# Patient Record
Sex: FEMALE | Race: WHITE | NOT HISPANIC OR LATINO | Employment: OTHER | URBAN - NONMETROPOLITAN AREA
[De-identification: names, ages, dates, MRNs, and addresses within clinical notes are randomized per-mention and may not be internally consistent; named-entity substitution may affect disease eponyms.]

---

## 2023-10-19 ENCOUNTER — TELEPHONE (OUTPATIENT)
Dept: PRIMARY CARE | Facility: CLINIC | Age: 68
End: 2023-10-19

## 2023-10-19 DIAGNOSIS — Z12.31 ENCOUNTER FOR SCREENING MAMMOGRAM FOR MALIGNANT NEOPLASM OF BREAST: ICD-10-CM

## 2023-11-20 ENCOUNTER — OFFICE VISIT (OUTPATIENT)
Dept: PRIMARY CARE | Facility: CLINIC | Age: 68
End: 2023-11-20
Payer: MEDICARE

## 2023-11-20 ENCOUNTER — LAB (OUTPATIENT)
Dept: LAB | Facility: LAB | Age: 68
End: 2023-11-20
Payer: MEDICARE

## 2023-11-20 VITALS
WEIGHT: 129.6 LBS | BODY MASS INDEX: 24.47 KG/M2 | HEART RATE: 54 BPM | SYSTOLIC BLOOD PRESSURE: 158 MMHG | OXYGEN SATURATION: 99 % | TEMPERATURE: 97.9 F | DIASTOLIC BLOOD PRESSURE: 70 MMHG | HEIGHT: 61 IN

## 2023-11-20 DIAGNOSIS — Z23 ENCOUNTER FOR IMMUNIZATION: ICD-10-CM

## 2023-11-20 DIAGNOSIS — Z12.31 ENCOUNTER FOR SCREENING MAMMOGRAM FOR MALIGNANT NEOPLASM OF BREAST: ICD-10-CM

## 2023-11-20 DIAGNOSIS — E55.9 VITAMIN D DEFICIENCY: ICD-10-CM

## 2023-11-20 DIAGNOSIS — E03.9 ACQUIRED HYPOTHYROIDISM: ICD-10-CM

## 2023-11-20 DIAGNOSIS — L29.9 ITCHING OF EAR: ICD-10-CM

## 2023-11-20 DIAGNOSIS — Z00.00 PHYSICAL EXAM: ICD-10-CM

## 2023-11-20 DIAGNOSIS — Z13.6 SCREENING FOR HEART DISEASE: ICD-10-CM

## 2023-11-20 DIAGNOSIS — R53.83 TIRED: ICD-10-CM

## 2023-11-20 DIAGNOSIS — Z00.00 PHYSICAL EXAM: Primary | ICD-10-CM

## 2023-11-20 LAB
ALBUMIN SERPL BCP-MCNC: 4.6 G/DL (ref 3.4–5)
ALP SERPL-CCNC: 121 U/L (ref 33–136)
ALT SERPL W P-5'-P-CCNC: 11 U/L (ref 7–45)
ANION GAP SERPL CALC-SCNC: 11 MMOL/L (ref 10–20)
AST SERPL W P-5'-P-CCNC: 17 U/L (ref 9–39)
BASOPHILS # BLD AUTO: 0.04 X10*3/UL (ref 0–0.1)
BASOPHILS NFR BLD AUTO: 0.8 %
BILIRUB SERPL-MCNC: 0.6 MG/DL (ref 0–1.2)
BUN SERPL-MCNC: 11 MG/DL (ref 6–23)
CALCIUM SERPL-MCNC: 10.2 MG/DL (ref 8.6–10.3)
CHLORIDE SERPL-SCNC: 103 MMOL/L (ref 98–107)
CHOLEST SERPL-MCNC: 250 MG/DL (ref 0–199)
CHOLESTEROL/HDL RATIO: 5.1
CO2 SERPL-SCNC: 29 MMOL/L (ref 21–32)
CREAT SERPL-MCNC: 0.83 MG/DL (ref 0.5–1.05)
EOSINOPHIL # BLD AUTO: 0.13 X10*3/UL (ref 0–0.7)
EOSINOPHIL NFR BLD AUTO: 2.4 %
ERYTHROCYTE [DISTWIDTH] IN BLOOD BY AUTOMATED COUNT: 14 % (ref 11.5–14.5)
GFR SERPL CREATININE-BSD FRML MDRD: 76 ML/MIN/1.73M*2
GLUCOSE SERPL-MCNC: 88 MG/DL (ref 74–99)
HCT VFR BLD AUTO: 45.9 % (ref 36–46)
HDLC SERPL-MCNC: 48.7 MG/DL
HGB BLD-MCNC: 14.4 G/DL (ref 12–16)
IMM GRANULOCYTES # BLD AUTO: 0.01 X10*3/UL (ref 0–0.7)
IMM GRANULOCYTES NFR BLD AUTO: 0.2 % (ref 0–0.9)
LDLC SERPL CALC-MCNC: 161 MG/DL
LYMPHOCYTES # BLD AUTO: 1.5 X10*3/UL (ref 1.2–4.8)
LYMPHOCYTES NFR BLD AUTO: 28.2 %
MCH RBC QN AUTO: 28.2 PG (ref 26–34)
MCHC RBC AUTO-ENTMCNC: 31.4 G/DL (ref 32–36)
MCV RBC AUTO: 90 FL (ref 80–100)
MONOCYTES # BLD AUTO: 0.59 X10*3/UL (ref 0.1–1)
MONOCYTES NFR BLD AUTO: 11.1 %
NEUTROPHILS # BLD AUTO: 3.05 X10*3/UL (ref 1.2–7.7)
NEUTROPHILS NFR BLD AUTO: 57.3 %
NON HDL CHOLESTEROL: 201 MG/DL (ref 0–149)
NRBC BLD-RTO: 0 /100 WBCS (ref 0–0)
PLATELET # BLD AUTO: 289 X10*3/UL (ref 150–450)
POTASSIUM SERPL-SCNC: 4.5 MMOL/L (ref 3.5–5.3)
PROT SERPL-MCNC: 7.8 G/DL (ref 6.4–8.2)
RBC # BLD AUTO: 5.1 X10*6/UL (ref 4–5.2)
SODIUM SERPL-SCNC: 138 MMOL/L (ref 136–145)
T4 FREE SERPL-MCNC: 0.84 NG/DL (ref 0.61–1.12)
TRIGL SERPL-MCNC: 200 MG/DL (ref 0–149)
TSH SERPL-ACNC: 8.42 MIU/L (ref 0.44–3.98)
VLDL: 40 MG/DL (ref 0–40)
WBC # BLD AUTO: 5.3 X10*3/UL (ref 4.4–11.3)

## 2023-11-20 PROCEDURE — 1159F MED LIST DOCD IN RCRD: CPT | Performed by: FAMILY MEDICINE

## 2023-11-20 PROCEDURE — 90662 IIV NO PRSV INCREASED AG IM: CPT | Performed by: FAMILY MEDICINE

## 2023-11-20 PROCEDURE — 1170F FXNL STATUS ASSESSED: CPT | Performed by: FAMILY MEDICINE

## 2023-11-20 PROCEDURE — 1126F AMNT PAIN NOTED NONE PRSNT: CPT | Performed by: FAMILY MEDICINE

## 2023-11-20 PROCEDURE — G0439 PPPS, SUBSEQ VISIT: HCPCS | Performed by: FAMILY MEDICINE

## 2023-11-20 PROCEDURE — 1160F RVW MEDS BY RX/DR IN RCRD: CPT | Performed by: FAMILY MEDICINE

## 2023-11-20 PROCEDURE — 82607 VITAMIN B-12: CPT

## 2023-11-20 PROCEDURE — 1036F TOBACCO NON-USER: CPT | Performed by: FAMILY MEDICINE

## 2023-11-20 PROCEDURE — 82306 VITAMIN D 25 HYDROXY: CPT

## 2023-11-20 PROCEDURE — 36415 COLL VENOUS BLD VENIPUNCTURE: CPT

## 2023-11-20 RX ORDER — LEVOTHYROXINE SODIUM 75 UG/1
75 TABLET ORAL DAILY
COMMUNITY
End: 2023-12-04 | Stop reason: ALTCHOICE

## 2023-11-20 RX ORDER — SUCRALFATE 1 G/10ML
1 SUSPENSION ORAL EVERY 12 HOURS
COMMUNITY
Start: 2022-09-26 | End: 2023-12-04 | Stop reason: WASHOUT

## 2023-11-20 RX ORDER — METOCLOPRAMIDE HYDROCHLORIDE 5 MG/1
TABLET, ORALLY DISINTEGRATING ORAL
COMMUNITY
End: 2023-12-04 | Stop reason: WASHOUT

## 2023-11-20 RX ORDER — FLUOCINOLONE ACETONIDE 0.11 MG/ML
5 OIL AURICULAR (OTIC) 2 TIMES DAILY PRN
Qty: 20 ML | Refills: 1 | Status: SHIPPED | OUTPATIENT
Start: 2023-11-20

## 2023-11-20 ASSESSMENT — ACTIVITIES OF DAILY LIVING (ADL)
BATHING: INDEPENDENT
GROCERY_SHOPPING: INDEPENDENT
TAKING_MEDICATION: INDEPENDENT
MANAGING_FINANCES: INDEPENDENT
DOING_HOUSEWORK: INDEPENDENT
DRESSING: INDEPENDENT

## 2023-11-20 ASSESSMENT — ENCOUNTER SYMPTOMS
NAUSEA: 0
DIZZINESS: 0
FEVER: 0
SHORTNESS OF BREATH: 0
DIARRHEA: 0
ENDOCRINE NEGATIVE: 1
DIFFICULTY URINATING: 0

## 2023-11-20 ASSESSMENT — PATIENT HEALTH QUESTIONNAIRE - PHQ9
SUM OF ALL RESPONSES TO PHQ9 QUESTIONS 1 AND 2: 0
2. FEELING DOWN, DEPRESSED OR HOPELESS: NOT AT ALL
1. LITTLE INTEREST OR PLEASURE IN DOING THINGS: NOT AT ALL

## 2023-11-20 ASSESSMENT — PAIN SCALES - GENERAL: PAINLEVEL: 0-NO PAIN

## 2023-11-20 NOTE — PROGRESS NOTES
"Subjective   Patient ID: Camilla Lal is a 68 y.o. female who presents for Medicare Annual Wellness Visit Subsequent.    Physical exam  Hypothyroidism  Tired  Itchy ears       Review of Systems   Constitutional:  Negative for fever.        Also see HPI   Eyes:  Negative for visual disturbance.   Respiratory:  Negative for shortness of breath.    Cardiovascular:  Negative for chest pain.   Gastrointestinal:  Negative for diarrhea and nausea.   Endocrine: Negative.    Genitourinary:  Negative for difficulty urinating.   Skin:  Negative for rash.   Neurological:  Negative for dizziness.        No focal deficits   Psychiatric/Behavioral:  Negative for suicidal ideas.    All other systems reviewed and are negative.      Objective   /70   Pulse 54   Temp 36.6 °C (97.9 °F)   Ht 1.537 m (5' 0.5\")   Wt 58.8 kg (129 lb 9.6 oz)   SpO2 99%   BMI 24.89 kg/m²     Physical Exam  Vitals and nursing note reviewed.   Constitutional:       Appearance: Normal appearance.   HENT:      Head: Normocephalic and atraumatic.   Eyes:      Extraocular Movements: Extraocular movements intact.      Conjunctiva/sclera: Conjunctivae normal.   Cardiovascular:      Rate and Rhythm: Normal rate and regular rhythm.      Heart sounds: Normal heart sounds.   Pulmonary:      Effort: Pulmonary effort is normal.      Breath sounds: Normal breath sounds.      Comments: Lungs essentially CTA b/l  Abdominal:      General: There is no distension.      Palpations: Abdomen is soft. There is no mass.      Tenderness: There is no abdominal tenderness.   Musculoskeletal:      Right lower leg: No edema.      Left lower leg: No edema.   Skin:     Coloration: Skin is not jaundiced.      Findings: No rash.   Neurological:      General: No focal deficit present.      Mental Status: She is alert and oriented to person, place, and time.   Psychiatric:         Mood and Affect: Mood normal.         Behavior: Behavior normal.         Thought Content: " Thought content normal.         Judgment: Judgment normal.       Assessment/Plan   Problem List Items Addressed This Visit    None  Visit Diagnoses         Codes    Physical exam    -  Primary Z00.00    Relevant Orders    Comprehensive Metabolic Panel    TSH with reflex to Free T4 if abnormal    Lipid Panel    CBC and Auto Differential    Vitamin B12    Vitamin D 25-Hydroxy,Total (for eval of Vitamin D levels)    Encounter for immunization     Z23    Relevant Orders    Flu vaccine, quadrivalent, high-dose, preservative free, age 65y+ (FLUZONE)    Itching of ear     L29.9    Relevant Medications    fluocinolone (DermOtic) 0.01 % ear drops    Encounter for screening mammogram for malignant neoplasm of breast     Z12.31    Tired     R53.83    Relevant Orders    Comprehensive Metabolic Panel    TSH with reflex to Free T4 if abnormal    CBC and Auto Differential    Vitamin B12    Acquired hypothyroidism     E03.9    Relevant Orders    TSH with reflex to Free T4 if abnormal    Screening for heart disease     Z13.6    Relevant Orders    Lipid Panel    Vitamin D deficiency     E55.9    Relevant Orders    Vitamin D 25-Hydroxy,Total (for eval of Vitamin D levels)

## 2023-11-21 LAB
25(OH)D3 SERPL-MCNC: 46 NG/ML (ref 30–100)
VIT B12 SERPL-MCNC: 331 PG/ML (ref 211–911)

## 2023-12-04 ENCOUNTER — OFFICE VISIT (OUTPATIENT)
Dept: OBSTETRICS AND GYNECOLOGY | Facility: CLINIC | Age: 68
End: 2023-12-04
Payer: MEDICARE

## 2023-12-04 VITALS
SYSTOLIC BLOOD PRESSURE: 134 MMHG | WEIGHT: 120 LBS | HEIGHT: 62 IN | DIASTOLIC BLOOD PRESSURE: 78 MMHG | BODY MASS INDEX: 22.08 KG/M2

## 2023-12-04 DIAGNOSIS — Z12.31 VISIT FOR SCREENING MAMMOGRAM: ICD-10-CM

## 2023-12-04 DIAGNOSIS — Z01.419 WELL WOMAN EXAM: Primary | ICD-10-CM

## 2023-12-04 PROCEDURE — 1036F TOBACCO NON-USER: CPT | Performed by: OBSTETRICS & GYNECOLOGY

## 2023-12-04 PROCEDURE — 99387 INIT PM E/M NEW PAT 65+ YRS: CPT | Performed by: OBSTETRICS & GYNECOLOGY

## 2023-12-04 PROCEDURE — 1126F AMNT PAIN NOTED NONE PRSNT: CPT | Performed by: OBSTETRICS & GYNECOLOGY

## 2023-12-04 PROCEDURE — 1160F RVW MEDS BY RX/DR IN RCRD: CPT | Performed by: OBSTETRICS & GYNECOLOGY

## 2023-12-04 PROCEDURE — 1159F MED LIST DOCD IN RCRD: CPT | Performed by: OBSTETRICS & GYNECOLOGY

## 2023-12-04 RX ORDER — LEVOTHYROXINE SODIUM 88 UG/1
88 TABLET ORAL DAILY
Qty: 30 TABLET | Refills: 11 | Status: SHIPPED | OUTPATIENT
Start: 2023-12-04 | End: 2024-12-03

## 2023-12-04 NOTE — PROGRESS NOTES
"Subjective   Patient ID: Camilla Lal \"Osei" is a 68 y.o. female who presents for well woman visit.  New patient for Medicare pelvic breast exam.  5 years old.  .  2 children.  Gynecologic surgeries include surgery for an ectopic pregnancy in 1989    Postmenopausal.  Current meds for thyroid.  Former smoker    She worked as a  at [employer] for 24 years.  Recently retired        Review of Systems   All other systems reviewed and are negative.      Objective   Physical Exam  Constitutional:       Appearance: Normal appearance. She is normal weight.   Chest:   Breasts:     Right: Normal.      Left: Normal.   Genitourinary:     General: Normal vulva.      Vagina: Normal.      Cervix: Normal.      Uterus: Normal.       Adnexa: Right adnexa normal and left adnexa normal.   Neurological:      Mental Status: She is alert.         Assessment/Plan   New patient Medicare pelvic breast exam.  Order for mammogram       "

## 2023-12-09 ENCOUNTER — HOSPITAL ENCOUNTER (OUTPATIENT)
Dept: RADIOLOGY | Facility: HOSPITAL | Age: 68
Discharge: HOME | End: 2023-12-09
Payer: MEDICARE

## 2023-12-09 DIAGNOSIS — Z12.31 VISIT FOR SCREENING MAMMOGRAM: ICD-10-CM

## 2023-12-09 PROCEDURE — 77067 SCR MAMMO BI INCL CAD: CPT | Performed by: RADIOLOGY

## 2023-12-09 PROCEDURE — 77063 BREAST TOMOSYNTHESIS BI: CPT | Performed by: RADIOLOGY

## 2023-12-09 PROCEDURE — 77063 BREAST TOMOSYNTHESIS BI: CPT

## 2023-12-12 DIAGNOSIS — R92.8 ABNORMAL MAMMOGRAM OF RIGHT BREAST: ICD-10-CM

## 2023-12-18 ENCOUNTER — APPOINTMENT (OUTPATIENT)
Dept: RADIOLOGY | Facility: HOSPITAL | Age: 68
End: 2023-12-18
Payer: MEDICARE

## 2023-12-18 ENCOUNTER — HOSPITAL ENCOUNTER (OUTPATIENT)
Dept: RADIOLOGY | Facility: HOSPITAL | Age: 68
Discharge: HOME | End: 2023-12-18
Payer: MEDICARE

## 2023-12-18 DIAGNOSIS — R92.8 ABNORMAL MAMMOGRAM: ICD-10-CM

## 2023-12-18 PROCEDURE — 76981 USE PARENCHYMA: CPT

## 2023-12-18 PROCEDURE — 76982 USE 1ST TARGET LESION: CPT | Mod: RIGHT SIDE | Performed by: RADIOLOGY

## 2023-12-18 PROCEDURE — 76642 ULTRASOUND BREAST LIMITED: CPT | Mod: RIGHT SIDE | Performed by: RADIOLOGY

## 2023-12-18 PROCEDURE — 76642 ULTRASOUND BREAST LIMITED: CPT | Mod: RT

## 2023-12-19 DIAGNOSIS — R92.8 ABNORMAL MAMMOGRAM: ICD-10-CM

## 2023-12-20 ENCOUNTER — OFFICE VISIT (OUTPATIENT)
Dept: SURGERY | Facility: CLINIC | Age: 68
End: 2023-12-20
Payer: MEDICARE

## 2023-12-20 VITALS
WEIGHT: 128 LBS | SYSTOLIC BLOOD PRESSURE: 139 MMHG | DIASTOLIC BLOOD PRESSURE: 73 MMHG | TEMPERATURE: 97 F | HEART RATE: 56 BPM | BODY MASS INDEX: 25.13 KG/M2 | HEIGHT: 60 IN

## 2023-12-20 DIAGNOSIS — R92.8 ABNORMAL MAMMOGRAM OF RIGHT BREAST: Primary | ICD-10-CM

## 2023-12-20 DIAGNOSIS — R92.8 ABNORMAL MAMMOGRAM: ICD-10-CM

## 2023-12-20 PROCEDURE — 1036F TOBACCO NON-USER: CPT | Performed by: SURGERY

## 2023-12-20 PROCEDURE — 1160F RVW MEDS BY RX/DR IN RCRD: CPT | Performed by: SURGERY

## 2023-12-20 PROCEDURE — 1126F AMNT PAIN NOTED NONE PRSNT: CPT | Performed by: SURGERY

## 2023-12-20 PROCEDURE — 1159F MED LIST DOCD IN RCRD: CPT | Performed by: SURGERY

## 2023-12-20 PROCEDURE — 99214 OFFICE O/P EST MOD 30 MIN: CPT | Performed by: SURGERY

## 2023-12-20 RX ORDER — ASPIRIN 81 MG/1
81 TABLET ORAL DAILY
COMMUNITY

## 2023-12-20 NOTE — PATIENT INSTRUCTIONS
You have a very small abnormality of the right breast.  An image guided biopsy recommended.  I will have this arranged for you at [facility].  I will call you as soon as I get that result.

## 2023-12-20 NOTE — PROGRESS NOTES
"Subjective   Patient ID: Camilla Lal \"Osei" is a 68 y.o. female who presents for an abnormal mammogram of the right breast.    HPI   This patient who underwent mammography on 12/11/2023 and 12/18/2023.  She was noted to have an abnormal mammogram of the right breast.  Recommendation made for image guided biopsy.  NO FH of breast or ovarian cancer, NO previous breast surgery , Menses at  , Menopause at  , Children  , Age at first child  , Age at last child  ,  Breast feeding NO , Nipple discharge NO, Breast pain NO, Birth control pill NO, Radiation NO, History of collagen vascular disease NO .        History reviewed. No pertinent past medical history.     Current Outpatient Medications on File Prior to Visit   Medication Sig Dispense Refill    aspirin 81 mg EC tablet Take 1 tablet (81 mg) by mouth once daily.      fluocinolone (DermOtic) 0.01 % ear drops Administer 5 drops into each ear 2 times a day as needed (itching ears). 20 mL 1    levothyroxine (Synthroid, Levoxyl) 88 mcg tablet Take 1 tablet (88 mcg) by mouth once daily. Take on an empty stomach 30 tablet 11    mv-min/iron/folic/calcium/vitK (WOMEN'S MULTIVITAMIN ORAL) once every 24 hours.       No current facility-administered medications on file prior to visit.        Review of Systems   All other systems reviewed and are negative.      Vitals:    12/20/23 1506   BP: 139/73   Pulse: 56   Temp: 36.1 °C (97 °F)        Objective     Physical Exam  Vitals reviewed. Exam conducted with a chaperone present.   Constitutional:       Appearance: Normal appearance.   HENT:      Head: Normocephalic.      Nose: Nose normal.      Mouth/Throat:      Pharynx: Oropharynx is clear.   Cardiovascular:      Rate and Rhythm: Normal rate and regular rhythm.      Heart sounds: Normal heart sounds.   Pulmonary:      Effort: Pulmonary effort is normal.      Breath sounds: Normal breath sounds.   Chest:   Breasts:     Right: Normal.      Left: Normal.   Abdominal:      " General: Abdomen is flat.      Palpations: Abdomen is soft. There is no mass.      Tenderness: There is no abdominal tenderness. There is no guarding.      Hernia: No hernia is present.   Musculoskeletal:         General: Normal range of motion.      Cervical back: Normal range of motion.   Skin:     General: Skin is warm.   Neurological:      General: No focal deficit present.   Psychiatric:         Mood and Affect: Mood normal.         Problem List Items Addressed This Visit       Abnormal mammogram of right breast - Primary        Assessment/Plan   Plan-image guided biopsy [facility].  Follow-up in 3 weeks.  I will call you with the result.  Theodore Case MD

## 2024-01-03 ENCOUNTER — HOSPITAL ENCOUNTER (OUTPATIENT)
Dept: RADIOLOGY | Facility: HOSPITAL | Age: 69
Discharge: HOME | End: 2024-01-03
Payer: MEDICARE

## 2024-01-03 ENCOUNTER — ANCILLARY PROCEDURE (OUTPATIENT)
Dept: RADIOLOGY | Facility: HOSPITAL | Age: 69
End: 2024-01-03
Payer: MEDICARE

## 2024-01-03 VITALS
RESPIRATION RATE: 18 BRPM | OXYGEN SATURATION: 97 % | DIASTOLIC BLOOD PRESSURE: 66 MMHG | SYSTOLIC BLOOD PRESSURE: 171 MMHG | HEART RATE: 59 BPM

## 2024-01-03 DIAGNOSIS — R92.8 ABNORMAL MAMMOGRAM OF RIGHT BREAST: ICD-10-CM

## 2024-01-03 PROCEDURE — 19083 BX BREAST 1ST LESION US IMAG: CPT | Mod: RT

## 2024-01-03 PROCEDURE — 77065 DX MAMMO INCL CAD UNI: CPT | Mod: RT

## 2024-01-03 PROCEDURE — 88305 TISSUE EXAM BY PATHOLOGIST: CPT | Mod: TC,SUR,GEALAB | Performed by: SURGERY

## 2024-01-03 PROCEDURE — 19083 BX BREAST 1ST LESION US IMAG: CPT | Mod: RIGHT SIDE | Performed by: RADIOLOGY

## 2024-01-03 PROCEDURE — 77065 DX MAMMO INCL CAD UNI: CPT | Mod: RIGHT SIDE | Performed by: RADIOLOGY

## 2024-01-03 PROCEDURE — 88305 TISSUE EXAM BY PATHOLOGIST: CPT | Performed by: PATHOLOGY

## 2024-01-03 PROCEDURE — 2720000007 HC OR 272 NO HCPCS

## 2024-01-03 NOTE — DISCHARGE INSTRUCTIONS
Okay to remove band-aid tomorrow  Okay to shower tomorrow  Steri-strips will fall off on own  Okay to use tylenol as needed for pain  Okay to use ice as needed for pain  Follow up with Dr. Case in 10-14 days for results  Resume normal diet  Notify MD with any s/s of infection or active bleeding

## 2024-01-08 LAB
LABORATORY COMMENT REPORT: NORMAL
PATH REPORT.FINAL DX SPEC: NORMAL
PATH REPORT.GROSS SPEC: NORMAL
PATH REPORT.TOTAL CANCER: NORMAL

## 2024-01-12 ENCOUNTER — OFFICE VISIT (OUTPATIENT)
Dept: SURGERY | Facility: CLINIC | Age: 69
End: 2024-01-12
Payer: MEDICARE

## 2024-01-12 ENCOUNTER — TELEPHONE (OUTPATIENT)
Dept: SURGERY | Facility: HOSPITAL | Age: 69
End: 2024-01-12

## 2024-01-12 VITALS
SYSTOLIC BLOOD PRESSURE: 140 MMHG | HEART RATE: 58 BPM | TEMPERATURE: 97 F | WEIGHT: 131 LBS | HEIGHT: 61 IN | DIASTOLIC BLOOD PRESSURE: 72 MMHG | BODY MASS INDEX: 24.73 KG/M2

## 2024-01-12 DIAGNOSIS — D24.1 INTRADUCTAL PAPILLOMA OF RIGHT BREAST: Primary | ICD-10-CM

## 2024-01-12 PROCEDURE — 1126F AMNT PAIN NOTED NONE PRSNT: CPT | Performed by: SURGERY

## 2024-01-12 PROCEDURE — 1036F TOBACCO NON-USER: CPT | Performed by: SURGERY

## 2024-01-12 PROCEDURE — 1159F MED LIST DOCD IN RCRD: CPT | Performed by: SURGERY

## 2024-01-12 PROCEDURE — 99212 OFFICE O/P EST SF 10 MIN: CPT | Performed by: SURGERY

## 2024-01-12 NOTE — TELEPHONE ENCOUNTER
I contacted the patient regarding follow-up on the decision regarding her intraductal papilloma.  Current guidelines were again reviewed the visited.  There is literature published by [facility] indicating that as per my note conservative management can be appropriate at 2 lesions that have no atypia and around or less than a centimeter.  I I shared this with the patient.  The radiologist also amended the report will follow the patient conservatively she will be undergoing imaging in 1 year.

## 2024-01-12 NOTE — PROGRESS NOTES
"  Patient ID: Camilla Lal \"Osei" is a 68 y.o. female.  Following up after an image guided biopsy of the right breast.  Intraductal papilloma noted with usual ductal hyperplasia    Subjective   HPI  Intraductal papilloma noted with usual ductal hyperplasia, radiology made a recommendation for biopsy.    Review of Systems   All other systems reviewed and are negative.      Objective   Physical Exam  Constitutional:       Appearance: Normal appearance.   Chest:   Breasts:     Right: Swelling present.      Comments: Right breast with some ecchymosis.      Pathology review  INAL DIAGNOSIS   A. Breast, right, core biopsy:    -- Intraductal papilloma with usual ductal hyperplasia.   -- Columnar cell change and hyperplasia.     Problem List Items Addressed This Visit       Intraductal papilloma of right breast - Primary        Assessment/Plan   Plan-discussed the findings with the patient per recent guidelines intraductal papillomas without atypia that are less than a centimeter can be followed conservatively while the radiology report is recommend excision I disagree with that report and feel that the patient can be treated conservatively based on the current guidelines for benign lesions of the breast.  I discussed at length with the patient.  She is very keen to not have surgical intervention would like to be followed conservatively.  I feel that she can undergo mammogram in 1 year and she will see me after this.  She understands agrees.    "

## 2024-01-12 NOTE — PATIENT INSTRUCTIONS
As we discussed you have an intraductal papilloma.  There is no atypia.  Per current guidelines recommendations at this time recommend surveillance versus excision.  With no atypia I will sample size which is smaller than a centimeter removed via vacuum-assisted biopsy conservative management would be more appropriate.  I will be obtaining another opinion from one of my colleagues.  I will contact you if there is any change in the recommendations.  Otherwise we will follow-up with you after your mammogram in 1 year.

## 2024-01-18 ENCOUNTER — LAB (OUTPATIENT)
Dept: LAB | Facility: LAB | Age: 69
End: 2024-01-18
Payer: MEDICARE

## 2024-01-18 DIAGNOSIS — E03.9 ACQUIRED HYPOTHYROIDISM: ICD-10-CM

## 2024-01-18 LAB — TSH SERPL-ACNC: 2.82 MIU/L (ref 0.44–3.98)

## 2024-01-18 PROCEDURE — 36415 COLL VENOUS BLD VENIPUNCTURE: CPT

## 2024-01-18 PROCEDURE — 84443 ASSAY THYROID STIM HORMONE: CPT

## 2024-11-17 DIAGNOSIS — E03.9 ACQUIRED HYPOTHYROIDISM: ICD-10-CM

## 2024-11-21 RX ORDER — LEVOTHYROXINE SODIUM 88 UG/1
88 TABLET ORAL DAILY
Qty: 30 TABLET | Refills: 11 | Status: SHIPPED | OUTPATIENT
Start: 2024-11-21

## 2024-12-09 ENCOUNTER — APPOINTMENT (OUTPATIENT)
Dept: OBSTETRICS AND GYNECOLOGY | Facility: CLINIC | Age: 69
End: 2024-12-09
Payer: MEDICARE

## 2024-12-09 VITALS
BODY MASS INDEX: 24.17 KG/M2 | DIASTOLIC BLOOD PRESSURE: 84 MMHG | WEIGHT: 128 LBS | HEIGHT: 61 IN | SYSTOLIC BLOOD PRESSURE: 122 MMHG

## 2024-12-09 DIAGNOSIS — N95.2 POSTMENOPAUSAL ATROPHIC VAGINITIS: Primary | ICD-10-CM

## 2024-12-09 DIAGNOSIS — Z01.411 ENCOUNTER FOR GYNECOLOGICAL EXAMINATION WITH ABNORMAL FINDING: ICD-10-CM

## 2024-12-09 PROCEDURE — 3008F BODY MASS INDEX DOCD: CPT | Performed by: MIDWIFE

## 2024-12-09 PROCEDURE — G0101 CA SCREEN;PELVIC/BREAST EXAM: HCPCS | Performed by: MIDWIFE

## 2024-12-09 PROCEDURE — 99212 OFFICE O/P EST SF 10 MIN: CPT | Performed by: MIDWIFE

## 2024-12-09 PROCEDURE — 1036F TOBACCO NON-USER: CPT | Performed by: MIDWIFE

## 2024-12-09 PROCEDURE — 1159F MED LIST DOCD IN RCRD: CPT | Performed by: MIDWIFE

## 2024-12-09 RX ORDER — ESTRADIOL 0.1 MG/G
CREAM VAGINAL
Qty: 42.5 G | Refills: 3 | Status: SHIPPED | OUTPATIENT
Start: 2024-12-09

## 2024-12-09 NOTE — PROGRESS NOTES
"Subjective   Patient ID: Camilla Lal \"Osei" is a 69 y.o. female who presents for annual. And c/o vulvar irritation off/on daily  HPI  PMHx: ; followed by Dr Case for right breast intraductal papilloma-- next visit with Dr Case in 2025; last pap  NIL Neg  SocHx:  44 yrs  ROS: no pelvic pain, no urinary c/o, NAD  Review of Systems    Objective   Physical Exam  Vitals and nursing note reviewed.   Constitutional:       Appearance: Normal appearance. She is normal weight.   HENT:      Nose: Nose normal.   Eyes:      Pupils: Pupils are equal, round, and reactive to light.   Neck:      Thyroid: No thyroid mass.   Cardiovascular:      Rate and Rhythm: Normal rate and regular rhythm.   Pulmonary:      Effort: Pulmonary effort is normal.      Breath sounds: Normal breath sounds.   Chest:      Chest wall: No mass.   Breasts:     Right: Normal.      Left: Normal.   Abdominal:      Palpations: Abdomen is soft. There is no mass.      Tenderness: There is no abdominal tenderness.   Genitourinary:     General: Normal vulva.      Labia:         Right: No rash, tenderness or lesion.         Left: No rash, tenderness or lesion.       Urethra: No prolapse, urethral pain, urethral swelling or urethral lesion.      Vagina: Normal.      Cervix: Normal.      Uterus: Normal.       Adnexa: Right adnexa normal and left adnexa normal.      Rectum: Normal.      Comments: Bladder wnl  Vulvovaginal atrophic changes    Musculoskeletal:         General: Normal range of motion.   Lymphadenopathy:      Cervical: No cervical adenopathy.      Lower Body: No right inguinal adenopathy. No left inguinal adenopathy.   Skin:     General: Skin is warm and dry.   Neurological:      Mental Status: She is alert and oriented to person, place, and time.      Motor: Motor function is intact.      Gait: Gait is intact.   Psychiatric:         Mood and Affect: Mood normal.         Assessment/Plan   Diagnoses and all orders for this " Medications Phoned  to Pharmacy [] yes [x]no     Medications ordered this visit were e-scribed.  Verified by order class [] yes  [x] no  List medications sent to pharmacy:     Medication changes or discontinuations were communicated to patient's pharmacy: [] yes  [x] no     Dictation completed at time of chart check: [x] yes  [] no     I have checked the documentation for today s encounters and the above information has been reviewed and completed.        Zena Verma, BRY March 3, 2022 1:26 PM     visit:  Postmenopausal atrophic vaginitis  -     estradiol (Estrace) 0.01 % (0.1 mg/gram) vaginal cream; Apply pea-sized amount of cream to affected area on Monday Wednesday and Friday for 2 wks, then taper to as needed use.  Encounter for gynecological examination with abnormal finding  -     BI mammo bilateral screening tomosynthesis; Future    Exam findings and vulvar skin care discussed  RTO AE/PRN       EMILY Jackson-JAMAL, ND 12/09/24 11:10 AM

## 2024-12-13 ENCOUNTER — LAB (OUTPATIENT)
Dept: LAB | Facility: LAB | Age: 69
End: 2024-12-13
Payer: MEDICARE

## 2024-12-13 ENCOUNTER — OFFICE VISIT (OUTPATIENT)
Dept: PRIMARY CARE | Facility: CLINIC | Age: 69
End: 2024-12-13
Payer: MEDICARE

## 2024-12-13 VITALS
HEIGHT: 61 IN | OXYGEN SATURATION: 98 % | TEMPERATURE: 98.1 F | DIASTOLIC BLOOD PRESSURE: 60 MMHG | WEIGHT: 131.4 LBS | SYSTOLIC BLOOD PRESSURE: 122 MMHG | BODY MASS INDEX: 24.81 KG/M2 | HEART RATE: 63 BPM

## 2024-12-13 DIAGNOSIS — Z00.00 PHYSICAL EXAM: ICD-10-CM

## 2024-12-13 DIAGNOSIS — R73.9 HYPERGLYCEMIA: ICD-10-CM

## 2024-12-13 DIAGNOSIS — Z13.6 SCREENING FOR HEART DISEASE: ICD-10-CM

## 2024-12-13 DIAGNOSIS — E03.9 ACQUIRED HYPOTHYROIDISM: ICD-10-CM

## 2024-12-13 DIAGNOSIS — R35.0 FREQUENT URINATION: ICD-10-CM

## 2024-12-13 DIAGNOSIS — Z00.00 PHYSICAL EXAM: Primary | ICD-10-CM

## 2024-12-13 DIAGNOSIS — E55.9 VITAMIN D DEFICIENCY: ICD-10-CM

## 2024-12-13 DIAGNOSIS — R53.83 TIRED: ICD-10-CM

## 2024-12-13 DIAGNOSIS — Z23 ENCOUNTER FOR IMMUNIZATION: ICD-10-CM

## 2024-12-13 DIAGNOSIS — F41.9 ANXIETY: ICD-10-CM

## 2024-12-13 LAB
ALBUMIN SERPL BCP-MCNC: 4.8 G/DL (ref 3.4–5)
ALP SERPL-CCNC: 99 U/L (ref 33–136)
ALT SERPL W P-5'-P-CCNC: 13 U/L (ref 7–45)
AMORPH CRY #/AREA UR COMP ASSIST: ABNORMAL /HPF
ANION GAP SERPL CALC-SCNC: 10 MMOL/L (ref 10–20)
APPEARANCE UR: CLEAR
AST SERPL W P-5'-P-CCNC: 18 U/L (ref 9–39)
BASOPHILS # BLD AUTO: 0.05 X10*3/UL (ref 0–0.1)
BASOPHILS NFR BLD AUTO: 0.9 %
BILIRUB SERPL-MCNC: 0.9 MG/DL (ref 0–1.2)
BILIRUB UR STRIP.AUTO-MCNC: NEGATIVE MG/DL
BUN SERPL-MCNC: 14 MG/DL (ref 6–23)
CALCIUM SERPL-MCNC: 9.9 MG/DL (ref 8.6–10.3)
CHLORIDE SERPL-SCNC: 104 MMOL/L (ref 98–107)
CHOLEST SERPL-MCNC: 236 MG/DL (ref 0–199)
CHOLESTEROL/HDL RATIO: 4.6
CO2 SERPL-SCNC: 28 MMOL/L (ref 21–32)
COLOR UR: YELLOW
CREAT SERPL-MCNC: 0.9 MG/DL (ref 0.5–1.05)
EGFRCR SERPLBLD CKD-EPI 2021: 68 ML/MIN/1.73M*2
EOSINOPHIL # BLD AUTO: 0.09 X10*3/UL (ref 0–0.4)
EOSINOPHIL NFR BLD AUTO: 1.6 %
ERYTHROCYTE [DISTWIDTH] IN BLOOD BY AUTOMATED COUNT: 13.5 % (ref 11.5–14.5)
GLUCOSE SERPL-MCNC: 87 MG/DL (ref 74–99)
GLUCOSE UR STRIP.AUTO-MCNC: NORMAL MG/DL
HCT VFR BLD AUTO: 47 % (ref 36–46)
HDLC SERPL-MCNC: 51.7 MG/DL
HGB BLD-MCNC: 14.8 G/DL (ref 12–16)
HOLD SPECIMEN: NORMAL
IMM GRANULOCYTES # BLD AUTO: 0 X10*3/UL (ref 0–0.5)
IMM GRANULOCYTES NFR BLD AUTO: 0 % (ref 0–0.9)
KETONES UR STRIP.AUTO-MCNC: NEGATIVE MG/DL
LDLC SERPL CALC-MCNC: 151 MG/DL
LEUKOCYTE ESTERASE UR QL STRIP.AUTO: ABNORMAL
LYMPHOCYTES # BLD AUTO: 1.82 X10*3/UL (ref 0.8–3)
LYMPHOCYTES NFR BLD AUTO: 31.4 %
MCH RBC QN AUTO: 28.5 PG (ref 26–34)
MCHC RBC AUTO-ENTMCNC: 31.5 G/DL (ref 32–36)
MCV RBC AUTO: 91 FL (ref 80–100)
MONOCYTES # BLD AUTO: 0.55 X10*3/UL (ref 0.05–0.8)
MONOCYTES NFR BLD AUTO: 9.5 %
NEUTROPHILS # BLD AUTO: 3.28 X10*3/UL (ref 1.6–5.5)
NEUTROPHILS NFR BLD AUTO: 56.6 %
NITRITE UR QL STRIP.AUTO: NEGATIVE
NON HDL CHOLESTEROL: 184 MG/DL (ref 0–149)
NRBC BLD-RTO: 0 /100 WBCS (ref 0–0)
PH UR STRIP.AUTO: 5.5 [PH]
PLATELET # BLD AUTO: 275 X10*3/UL (ref 150–450)
POTASSIUM SERPL-SCNC: 4.4 MMOL/L (ref 3.5–5.3)
PROT SERPL-MCNC: 7.6 G/DL (ref 6.4–8.2)
PROT UR STRIP.AUTO-MCNC: NEGATIVE MG/DL
RBC # BLD AUTO: 5.19 X10*6/UL (ref 4–5.2)
RBC # UR STRIP.AUTO: ABNORMAL /UL
RBC #/AREA URNS AUTO: ABNORMAL /HPF
SODIUM SERPL-SCNC: 138 MMOL/L (ref 136–145)
SP GR UR STRIP.AUTO: 1.02
SQUAMOUS #/AREA URNS AUTO: ABNORMAL /HPF
TRIGL SERPL-MCNC: 165 MG/DL (ref 0–149)
TSH SERPL-ACNC: 1.34 MIU/L (ref 0.44–3.98)
UROBILINOGEN UR STRIP.AUTO-MCNC: NORMAL MG/DL
VLDL: 33 MG/DL (ref 0–40)
WBC # BLD AUTO: 5.8 X10*3/UL (ref 4.4–11.3)
WBC #/AREA URNS AUTO: ABNORMAL /HPF

## 2024-12-13 PROCEDURE — 82607 VITAMIN B-12: CPT

## 2024-12-13 PROCEDURE — 36415 COLL VENOUS BLD VENIPUNCTURE: CPT

## 2024-12-13 PROCEDURE — G0439 PPPS, SUBSEQ VISIT: HCPCS | Performed by: FAMILY MEDICINE

## 2024-12-13 PROCEDURE — 1170F FXNL STATUS ASSESSED: CPT | Performed by: FAMILY MEDICINE

## 2024-12-13 PROCEDURE — 1126F AMNT PAIN NOTED NONE PRSNT: CPT | Performed by: FAMILY MEDICINE

## 2024-12-13 PROCEDURE — 3008F BODY MASS INDEX DOCD: CPT | Performed by: FAMILY MEDICINE

## 2024-12-13 PROCEDURE — 83036 HEMOGLOBIN GLYCOSYLATED A1C: CPT

## 2024-12-13 PROCEDURE — 82306 VITAMIN D 25 HYDROXY: CPT

## 2024-12-13 PROCEDURE — 1160F RVW MEDS BY RX/DR IN RCRD: CPT | Performed by: FAMILY MEDICINE

## 2024-12-13 PROCEDURE — 87086 URINE CULTURE/COLONY COUNT: CPT

## 2024-12-13 PROCEDURE — 99215 OFFICE O/P EST HI 40 MIN: CPT | Mod: 25 | Performed by: FAMILY MEDICINE

## 2024-12-13 PROCEDURE — 1159F MED LIST DOCD IN RCRD: CPT | Performed by: FAMILY MEDICINE

## 2024-12-13 PROCEDURE — G0008 ADMIN INFLUENZA VIRUS VAC: HCPCS | Performed by: FAMILY MEDICINE

## 2024-12-13 RX ORDER — LORAZEPAM 0.5 MG/1
0.5 TABLET ORAL DAILY PRN
Qty: 30 TABLET | Refills: 2 | Status: SHIPPED | OUTPATIENT
Start: 2024-12-13

## 2024-12-13 RX ORDER — CEPHALEXIN 500 MG/1
500 CAPSULE ORAL 3 TIMES DAILY
Qty: 21 CAPSULE | Refills: 0 | Status: SHIPPED | OUTPATIENT
Start: 2024-12-13 | End: 2024-12-20

## 2024-12-13 ASSESSMENT — ACTIVITIES OF DAILY LIVING (ADL)
JUDGMENT_ADEQUATE_SAFELY_COMPLETE_DAILY_ACTIVITIES: YES
BATHING: INDEPENDENT
TOILETING: INDEPENDENT
TAKING_MEDICATION: INDEPENDENT
HEARING - LEFT EAR: FUNCTIONAL
PATIENT'S MEMORY ADEQUATE TO SAFELY COMPLETE DAILY ACTIVITIES?: YES
FEEDING YOURSELF: INDEPENDENT
ADEQUATE_TO_COMPLETE_ADL: YES
DOING_HOUSEWORK: INDEPENDENT
WALKS IN HOME: INDEPENDENT
HEARING - RIGHT EAR: FUNCTIONAL
GROCERY_SHOPPING: INDEPENDENT
DRESSING YOURSELF: INDEPENDENT
GROOMING: INDEPENDENT
MANAGING_FINANCES: INDEPENDENT

## 2024-12-13 ASSESSMENT — PATIENT HEALTH QUESTIONNAIRE - PHQ9
2. FEELING DOWN, DEPRESSED OR HOPELESS: NOT AT ALL
1. LITTLE INTEREST OR PLEASURE IN DOING THINGS: NOT AT ALL
SUM OF ALL RESPONSES TO PHQ9 QUESTIONS 1 AND 2: 0

## 2024-12-13 ASSESSMENT — ENCOUNTER SYMPTOMS
LOSS OF SENSATION IN FEET: 0
DEPRESSION: 0
OCCASIONAL FEELINGS OF UNSTEADINESS: 0

## 2024-12-13 ASSESSMENT — PAIN SCALES - GENERAL: PAINLEVEL_OUTOF10: 0-NO PAIN

## 2024-12-14 LAB
25(OH)D3 SERPL-MCNC: 40 NG/ML (ref 30–100)
EST. AVERAGE GLUCOSE BLD GHB EST-MCNC: 103 MG/DL
HBA1C MFR BLD: 5.2 %
VIT B12 SERPL-MCNC: 373 PG/ML (ref 211–911)

## 2024-12-15 LAB — BACTERIA UR CULT: NORMAL

## 2024-12-26 ENCOUNTER — OFFICE VISIT (OUTPATIENT)
Dept: URGENT CARE | Age: 69
End: 2024-12-26
Payer: MEDICARE

## 2024-12-26 VITALS
TEMPERATURE: 97.7 F | HEART RATE: 57 BPM | OXYGEN SATURATION: 97 % | SYSTOLIC BLOOD PRESSURE: 147 MMHG | WEIGHT: 128 LBS | HEIGHT: 60 IN | BODY MASS INDEX: 25.13 KG/M2 | DIASTOLIC BLOOD PRESSURE: 73 MMHG

## 2024-12-26 DIAGNOSIS — H11.151 PINGUECULA OF RIGHT EYE: Primary | ICD-10-CM

## 2024-12-26 DIAGNOSIS — S05.01XA ABRASION OF RIGHT CORNEA, INITIAL ENCOUNTER: ICD-10-CM

## 2024-12-26 PROCEDURE — 99203 OFFICE O/P NEW LOW 30 MIN: CPT

## 2024-12-26 PROCEDURE — 1160F RVW MEDS BY RX/DR IN RCRD: CPT

## 2024-12-26 PROCEDURE — 3008F BODY MASS INDEX DOCD: CPT

## 2024-12-26 PROCEDURE — 1159F MED LIST DOCD IN RCRD: CPT

## 2024-12-26 RX ORDER — ERYTHROMYCIN 5 MG/G
OINTMENT OPHTHALMIC 4 TIMES DAILY
Qty: 3.5 G | Refills: 0 | Status: SHIPPED | OUTPATIENT
Start: 2024-12-26 | End: 2025-01-02

## 2024-12-26 ASSESSMENT — ENCOUNTER SYMPTOMS
SHORTNESS OF BREATH: 0
FEVER: 0
FATIGUE: 0
DIZZINESS: 0
EYE REDNESS: 0
PHOTOPHOBIA: 0
CHILLS: 0
COUGH: 0
EYE PAIN: 1
EYE DISCHARGE: 0
HEADACHES: 0
EYE ITCHING: 0

## 2024-12-26 NOTE — PATIENT INSTRUCTIONS
Discharge instructions    You need to follow-up with your ophthalmologist within the next 3 to 5 days for reevaluation and further treatment.    If you develop any visual dysfunction, intense uncontrollable eye pain, worsening symptoms you must immediately go to the emergency room for evaluation.    It is important to take prescriptions as prescribed and complete all antibiotics.     If your symptoms worsen you are instructed to immediately go to the emergency room for reevaluation and further assessment.    If you develop any chest pain, SOB, or difficulty breathing you are instructed to go to the emergency room for reevaluation.    All discharge instructions will be provided and explained to the patient at discharge.    If you have any questions regarding your treatment plan please call the [facility] urgent care clinic.

## 2024-12-26 NOTE — PROGRESS NOTES
"Subjective   Patient ID: Camilla Lal \"Osei" is a 69 y.o. female. They present today with a chief complaint of Foreign Body in Eye (Can feel it when she blinks but can't see anything. Sharp pain. Started today when she woke up. Said it felt irritated last night and was worse this morning/Tearing but no goopy).    History of Present Illness  Patient is a 69-year-old female presenting to the clinic with complaints of potential foreign body in eye.  Patient states since this morning she has had irritation with blinking of the right eye.  Patient states irritation is on the medial side of the eye.  Patient denies any visual dysfunction.  Patient denies any pain.  Patient states there is some mild irritation with blinking over the right eye.  Patient denies any drainage.  Patient denies any visual dysfunction.  Patient would like evaluation of the right eye.  Patient did not visualize any foreign body get into her eye.      History provided by:  Patient  Foreign Body in Eye  Associated symptoms: no chest pain, no cough, no fatigue, no fever, no headaches and no shortness of breath        Past Medical History  Allergies as of 12/26/2024 - Reviewed 12/26/2024   Allergen Reaction Noted    Codeine Other and Nausea/vomiting 11/20/2023    Scopolamine Hallucinations 11/20/2023       (Not in a hospital admission)       No past medical history on file.    Past Surgical History:   Procedure Laterality Date    ECTOPIC PREGNANCY SURGERY      OTHER SURGICAL HISTORY  09/26/2022    Cholecystectomy laparoscopic    OTHER SURGICAL HISTORY  09/26/2022    Colon surgery    OTHER SURGICAL HISTORY  09/26/2022    Tonsillectomy        reports that she quit smoking about 22 years ago. Her smoking use included cigarettes. She started smoking about 37 years ago. She has a 15 pack-year smoking history. She has never used smokeless tobacco. She reports that she does not currently use alcohol. She reports that she does not use drugs.    Review " of Systems  Review of Systems   Constitutional:  Negative for chills, fatigue and fever.   Eyes:  Positive for pain. Negative for photophobia, discharge, redness, itching and visual disturbance.   Respiratory:  Negative for cough and shortness of breath.    Cardiovascular:  Negative for chest pain.   Neurological:  Negative for dizziness and headaches.                                  Objective    Vitals:    12/26/24 1407   BP: 147/73   Pulse: 57   Temp: 36.5 °C (97.7 °F)   TempSrc: Temporal   SpO2: 97%   Weight: 58.1 kg (128 lb)   Height: 1.524 m (5')     No LMP recorded. Patient is postmenopausal.    Physical Exam  Constitutional:       General: She is not in acute distress.     Appearance: Normal appearance. She is normal weight. She is not ill-appearing or toxic-appearing.   HENT:      Head: Normocephalic and atraumatic.      Right Ear: Tympanic membrane, ear canal and external ear normal.      Left Ear: Tympanic membrane, ear canal and external ear normal.      Nose: Nose normal.      Mouth/Throat:      Mouth: Mucous membranes are moist.   Eyes:      General: No scleral icterus.        Right eye: No discharge.         Left eye: No discharge.      Extraocular Movements: Extraocular movements intact.      Conjunctiva/sclera: Conjunctivae normal.      Pupils: Pupils are equal, round, and reactive to light.      Comments: Right eye over the medial aspect concerning for potential pinguecula.  No signs of foreign bodies.  Pupils are equal round and reactive to light and accommodation.  Extraocular movements are intact.  No significant conjunctival erythema.  Potentially some conjunctival erythema medially right eye.  Fluorescein stain performed.  No Gabby sign.  Potentially small corneal abrasion superiorly.   Cardiovascular:      Rate and Rhythm: Normal rate and regular rhythm.      Heart sounds: Normal heart sounds. No murmur heard.     No friction rub. No gallop.   Pulmonary:      Effort: Pulmonary effort is  normal. No respiratory distress.      Breath sounds: Normal breath sounds. No stridor. No wheezing, rhonchi or rales.   Neurological:      Mental Status: She is alert.         Procedures    Point of Care Test & Imaging Results from this visit  No results found for this visit on 12/26/24.   No results found.    Diagnostic study results (if any) were reviewed by [facility].    Assessment/Plan   Allergies, medications, history, and pertinent labs/EKGs/Imaging reviewed by Carlos Byrd PA-C.     Medical Decision Making  Patient is a 69-year-old female presenting to the clinic with complaints of potential foreign body in eye.  Patient states since this morning she has had irritation with blinking of the right eye.  Patient states irritation is on the medial side of the eye.  Patient denies any visual dysfunction.  Patient denies any pain.  Patient states there is some mild irritation with blinking over the right eye.  Patient denies any drainage.  Patient denies any visual dysfunction.  Patient would like evaluation of the right eye.  Patient did not visualize any foreign body get into her eye.  Attending physician consulted on patient case.  Patient to be treated with erythromycin for potential small abrasion and pinguecula.  Strict discharge precautions as follows. Discharge instructions. You need to follow-up with your ophthalmologist within the next 3 to 5 days for reevaluation and further treatment. If you develop any visual dysfunction, intense uncontrollable eye pain, worsening symptoms you must immediately go to the emergency room for evaluation. It is important to take prescriptions as prescribed and complete all antibiotics. If your symptoms worsen you are instructed to immediately go to the emergency room for reevaluation and further assessment. If you develop any chest pain, SOB, or difficulty breathing you are instructed to go to the emergency room for reevaluation. All discharge instructions  will be provided and explained to the patient at discharge. If you have any questions regarding your treatment plan please call the [facility] urgent care clinic.     Orders and Diagnoses  There are no diagnoses linked to this encounter.    Medical Admin Record      Patient disposition: Home    Electronically signed by [facility]  2:49 PM

## 2024-12-27 ENCOUNTER — APPOINTMENT (OUTPATIENT)
Dept: RADIOLOGY | Facility: CLINIC | Age: 69
End: 2024-12-27
Payer: MEDICARE

## 2024-12-28 NOTE — PROGRESS NOTES
"Subjective   Patient ID: Ramona Lal is a 69 y.o. female who presents for Medicare Annual Wellness Visit Subsequent.    Medicare wellness exam  Frequent urination  Tired  Hypothyroidism hyperglycemia vitamin D deficiency  Chronic anxiety, OARRS reviewed, elizabeth         Review of Systems   Constitutional:  Negative for fever.        Also see HPI   Eyes:  Negative for visual disturbance.   Respiratory:  Negative for shortness of breath.    Cardiovascular:  Negative for chest pain.   Gastrointestinal:  Negative for diarrhea and nausea.   Endocrine: Negative.    Genitourinary:  Negative for difficulty urinating.   Skin:  Negative for rash.   Neurological:  Negative for dizziness.        No focal deficits   Psychiatric/Behavioral:  Negative for suicidal ideas.    All other systems reviewed and are negative.      Objective   /60   Pulse 63   Temp 36.7 °C (98.1 °F)   Ht 1.549 m (5' 1\")   Wt 59.6 kg (131 lb 6.4 oz)   SpO2 98%   BMI 24.83 kg/m²     Physical Exam  Vitals and nursing note reviewed.   Constitutional:       Appearance: Normal appearance.   HENT:      Head: Normocephalic and atraumatic.      Right Ear: Tympanic membrane, ear canal and external ear normal.      Left Ear: Tympanic membrane, ear canal and external ear normal.      Nose: Nose normal.      Mouth/Throat:      Mouth: Mucous membranes are moist.      Pharynx: Oropharynx is clear.   Eyes:      Extraocular Movements: Extraocular movements intact.      Conjunctiva/sclera: Conjunctivae normal.      Pupils: Pupils are equal, round, and reactive to light.   Cardiovascular:      Rate and Rhythm: Normal rate and regular rhythm.      Heart sounds: Normal heart sounds.   Pulmonary:      Effort: No respiratory distress.      Breath sounds: Normal breath sounds.   Abdominal:      General: Bowel sounds are normal. There is no distension.      Palpations: Abdomen is soft. There is no mass.      Tenderness: There is no abdominal tenderness. "   Musculoskeletal:      Cervical back: Normal range of motion and neck supple.      Right lower leg: No edema.      Left lower leg: No edema.   Skin:     Coloration: Skin is not jaundiced.      Findings: No rash.   Neurological:      General: No focal deficit present.      Mental Status: She is alert and oriented to person, place, and time.   Psychiatric:         Mood and Affect: Mood normal.         Speech: Speech normal.         Behavior: Behavior normal.         Thought Content: Thought content normal.         Judgment: Judgment normal.         Assessment/Plan   Diagnoses and all orders for this visit:  Physical exam  -     CBC and Auto Differential; Future  -     Comprehensive Metabolic Panel; Future  -     Lipid Panel; Future  -     TSH with reflex to Free T4 if abnormal; Future  Frequent urination  -     Urinalysis with Reflex Culture and Microscopic; Future  -     cephalexin (Keflex) 500 mg capsule; Take 1 capsule (500 mg) by mouth 3 times a day for 7 days.  Screening for heart disease  -     Lipid Panel; Future  Tired  -     CBC and Auto Differential; Future  -     Comprehensive Metabolic Panel; Future  -     Vitamin B12; Future  Encounter for immunization  -     Flu vaccine, trivalent, preservative free, HIGH-DOSE, age 65y+ (Fluzone)  Hyperglycemia  -     Hemoglobin A1C; Future  Vitamin D deficiency  -     Vitamin D 25-Hydroxy,Total (for eval of Vitamin D levels); Future  Acquired hypothyroidism  -     TSH with reflex to Free T4 if abnormal; Future  -     Follow Up In Primary Care - Established; Future  Anxiety  -     LORazepam (Ativan) 0.5 mg tablet; Take 1 tablet (0.5 mg) by mouth once daily as needed for anxiety.

## 2024-12-29 ASSESSMENT — ENCOUNTER SYMPTOMS
DIFFICULTY URINATING: 0
DIZZINESS: 0
SHORTNESS OF BREATH: 0
NAUSEA: 0
ENDOCRINE NEGATIVE: 1
FEVER: 0
DIARRHEA: 0

## 2025-01-02 ENCOUNTER — APPOINTMENT (OUTPATIENT)
Dept: RADIOLOGY | Facility: HOSPITAL | Age: 70
End: 2025-01-02
Payer: MEDICARE

## 2025-01-07 ENCOUNTER — APPOINTMENT (OUTPATIENT)
Dept: RADIOLOGY | Facility: HOSPITAL | Age: 70
End: 2025-01-07
Payer: MEDICARE

## 2025-01-08 ENCOUNTER — APPOINTMENT (OUTPATIENT)
Dept: SURGERY | Facility: CLINIC | Age: 70
End: 2025-01-08
Payer: MEDICARE

## 2025-01-09 ENCOUNTER — HOSPITAL ENCOUNTER (OUTPATIENT)
Dept: RADIOLOGY | Facility: HOSPITAL | Age: 70
Discharge: HOME | End: 2025-01-09
Payer: MEDICARE

## 2025-01-09 VITALS — WEIGHT: 128 LBS | BODY MASS INDEX: 25.13 KG/M2 | HEIGHT: 60 IN

## 2025-01-09 DIAGNOSIS — Z01.411 ENCOUNTER FOR GYNECOLOGICAL EXAMINATION WITH ABNORMAL FINDING: ICD-10-CM

## 2025-01-09 DIAGNOSIS — Z12.31 SCREENING MAMMOGRAM FOR BREAST CANCER: ICD-10-CM

## 2025-01-09 PROCEDURE — 77067 SCR MAMMO BI INCL CAD: CPT

## 2025-01-09 PROCEDURE — 77067 SCR MAMMO BI INCL CAD: CPT | Performed by: RADIOLOGY

## 2025-01-09 PROCEDURE — 77063 BREAST TOMOSYNTHESIS BI: CPT | Performed by: RADIOLOGY

## 2025-01-11 DIAGNOSIS — R92.8 ABNORMAL MAMMOGRAM: Primary | ICD-10-CM

## 2025-01-11 ASSESSMENT — ENCOUNTER SYMPTOMS
VOMITING: 0
NECK PAIN: 0
ABDOMINAL PAIN: 0
SORE THROAT: 0
DIARRHEA: 0
COUGH: 0
RHINORRHEA: 0
HEADACHES: 0

## 2025-01-14 ENCOUNTER — APPOINTMENT (OUTPATIENT)
Dept: AUDIOLOGY | Facility: CLINIC | Age: 70
End: 2025-01-14
Payer: MEDICARE

## 2025-01-14 ENCOUNTER — APPOINTMENT (OUTPATIENT)
Dept: OTOLARYNGOLOGY | Facility: CLINIC | Age: 70
End: 2025-01-14
Payer: MEDICARE

## 2025-01-14 VITALS — BODY MASS INDEX: 25.58 KG/M2 | WEIGHT: 131 LBS | TEMPERATURE: 96.8 F

## 2025-01-14 DIAGNOSIS — L30.9 DERMATITIS: Primary | ICD-10-CM

## 2025-01-14 DIAGNOSIS — H93.291 ABNORMAL AUDITORY PERCEPTION OF RIGHT EAR: ICD-10-CM

## 2025-01-14 DIAGNOSIS — R42 DIZZINESS: ICD-10-CM

## 2025-01-14 DIAGNOSIS — H92.01 RIGHT EAR PAIN: ICD-10-CM

## 2025-01-14 DIAGNOSIS — H90.3 ASYMMETRICAL SENSORINEURAL HEARING LOSS: ICD-10-CM

## 2025-01-14 DIAGNOSIS — H90.3 SENSORINEURAL HEARING LOSS (SNHL) OF BOTH EARS: Primary | ICD-10-CM

## 2025-01-14 PROCEDURE — 1036F TOBACCO NON-USER: CPT | Performed by: OTOLARYNGOLOGY

## 2025-01-14 PROCEDURE — 92557 COMPREHENSIVE HEARING TEST: CPT | Performed by: AUDIOLOGIST

## 2025-01-14 PROCEDURE — 1160F RVW MEDS BY RX/DR IN RCRD: CPT | Performed by: OTOLARYNGOLOGY

## 2025-01-14 PROCEDURE — 1159F MED LIST DOCD IN RCRD: CPT | Performed by: OTOLARYNGOLOGY

## 2025-01-14 PROCEDURE — 92550 TYMPANOMETRY & REFLEX THRESH: CPT | Performed by: AUDIOLOGIST

## 2025-01-14 PROCEDURE — 99214 OFFICE O/P EST MOD 30 MIN: CPT | Performed by: OTOLARYNGOLOGY

## 2025-01-14 RX ORDER — FLUOCINOLONE ACETONIDE 0.11 MG/ML
OIL AURICULAR (OTIC)
Qty: 20 ML | Refills: 1 | Status: SHIPPED | OUTPATIENT
Start: 2025-01-14

## 2025-01-14 NOTE — PROGRESS NOTES
AUDIOLOGY ADULT AUDIOMETRIC EVALUATION    Name:  Camilla Lal  :  1955  Age:  69 y.o.  Date of Evaluation:  2025    Reason for visit: Ms. Lal is seen in the clinic today at the request of otolaryngology for an audiologic evaluation.     HISTORY  Patient complains of pain, asymmetry, tinnitus and dizziness.  She has not worked in noise.    EVALUATION  See scanned audiogram: “Media” > “Audiology Report”.      RESULTS  Otoscopic Evaluation:  Right Ear: clear ear canal  Left Ear: clear ear canal    Immittance Measures:  Tympanometry:  Right Ear: Type A, normal tympanic membrane mobility with normal middle ear pressure   Left Ear: Type A, normal tympanic membrane mobility with normal middle ear pressure     Acoustic Reflexes:  Ipsilateral Right Ear:  NR NR NR  Ipsilateral Left Ear:    100 95 NR  Contralateral Right Ear: did not evaluate  Contralateral Left Ear: did not evaluate    Distortion Product Otoacoustic Emissions (DPOAEs):  Right Ear: REFER: 750- 8K HZ   Left Ear:   REFER: 750- 8 K HZ    Audiometry:  Test Technique and Reliability:  BEHAVIORAL  Standard audiometry via INSERTS. Reliability is good.    Pure tone air and bone conduction audiometry:  Right Ear:  MOSTLY MILD MODERATE TO SEVERE, ASYMMETRIC SNHL .  Left Ear: MILD MODERATE SNHL.    Speech Audiometry (Word Recognition Scores):   Right Ear:  92% GOOD  Left Ear:    92% GOOD    IMPRESSIONS  ASYMMETRIC SNHL WITH TINNITUS, DIZZY AND PAIN RIGHT EAR.   The presence of acoustic reflexes within normal intensity limits is consistent with normal middle ear and brainstem function, and suggests that auditory sensitivity is not significantly impaired. An elevated or absent acoustic reflex threshold is consistent with a middle ear disorder, hearing loss in the stimulated ear, and/or interruption of neural innervation of the stapedius muscle. Present DPOAEs suggest normal/near normal cochlear outer hair cell function and are consistent  with no greater than a mild hearing loss at those frequencies. Absent DPOAEs are consistent with abnormal cochlear outer hair cell function and some degree of hearing loss at those frequencies.    RECOMMENDATIONS  - Follow up with otolaryngology today as scheduled.Special tests for dizziness, tinnitus and right pain.  - Audiologic evaluation as needed.  - Annual audiologic evaluation, sooner if an acute change is noted.  - Audiologic evaluation in conjunction with otologic care, if an acute change is noted, and/or annually.  - Consider hearing aids.  Discussed and has [employer] and will check benefit. Contact insurance to determine if there is an applicable benefit and where it can be used. Contact our office to schedule an appointment should she wish to proceed with hearing aids through our clinic.  - Follow-up with medical care team as planned.    PATIENT EDUCATION  Discussed results, impressions and recommendations with the patient. Questions were addressed and the patient was encouraged to contact our office should concerns arise.    Time for this encounter: 35 minutes     Nicole Cuba  Licensed Audiologist

## 2025-01-14 NOTE — PROGRESS NOTES
"CHANELL Lal \"Osei" is a 69 y.o. female presents bothered by intermittent plugging sensation in the right ear.  Denies otalgia and otorrhea.  Had an audiogram with bilateral essentially symmetric sensorineural hearing loss with shallow tympanograms bilaterally.      Past Medical History:   Diagnosis Date    Colon cancer (Multi)             Medications:     Current Outpatient Medications:     aspirin 81 mg EC tablet, Take 1 tablet (81 mg) by mouth once daily., Disp: , Rfl:     estradiol (Estrace) 0.01 % (0.1 mg/gram) vaginal cream, Apply pea-sized amount of cream to affected area on Monday Wednesday and Friday for 2 wks, then taper to as needed use., Disp: 42.5 g, Rfl: 3    fluocinolone (DermOtic) 0.01 % ear drops, Administer 5 drops into each ear 2 times a day as needed (itching ears)., Disp: 20 mL, Rfl: 1    LORazepam (Ativan) 0.5 mg tablet, Take 1 tablet (0.5 mg) by mouth once daily as needed for anxiety., Disp: 30 tablet, Rfl: 2    mv-min/iron/folic/calcium/vitK (WOMEN'S MULTIVITAMIN ORAL), once every 24 hours., Disp: , Rfl:     levothyroxine (Synthroid, Levoxyl) 88 mcg tablet, TAKE 1 TABLET BY MOUTH ONCE DAILY ON AN EMPTY STOMACH (Patient not taking: Reported on 1/14/2025), Disp: 30 tablet, Rfl: 11     Allergies:  Allergies   Allergen Reactions    Codeine Other and Nausea/vomiting     vomitting    Scopolamine Hallucinations        Physical Exam:  Last Recorded Vitals  Temperature 36 °C (96.8 °F), weight 59.4 kg (131 lb).  General:     General appearance: Well-developed, well-nourished in no acute distress.       Voice:  normal       Head/face: Normal appearance; nontender to palpation     Facial nerve function: Normal and symmetric bilaterally.    Oral/oropharynx:     Oral vestibule: Normal labial and gingival mucosa     Tongue/floor of mouth: Normal without lesion     Oropharynx: Clear.  No lesions present of the hard/soft palate, posterior pharynx    Neck:     Neck: Normal appearance, trachea " midline     Salivary glands: Normal to palpation bilaterally     Lymph nodes: No cervical lymphadenopathy to palpation     Thyroid: No thyromegaly.  No palpable nodules     Range of motion: Normal    Neurological:     Cortical functions: Alert and oriented x3, appropriate affect       Larynx/hypopharynx:     Laryngeal findings: Mirror exam inadequate or limited secondary to enlarged base of tongue and/or excessive gagging    Ear:     Ear canal: Normal bilaterally     Tympanic membrane: Intact and mobile bilaterally.  Right side appears somewhat thickened and less mobile.  It is difficult to assess the middle ear through the tympanic membrane     Pinna: Normal bilaterally     Hearing:  Gross hearing assessment normal by voice    Nose:     Visualized using: Anterior rhinoscopy     Nasopharynx: Inadequate mirror exam secondary to gag, anatomy.       Nasal dorsum: Nontraumatic midline appearance     Septum: Midline     Inferior turbinates: Normally sized     Mucosa: Bilateral, pink, normal appearing       ASSESSMENT/PLAN:  The tympanic membrane is thickened and she has had dermatitis problems in the past.  I recommend that we try fluocinolone.  We also talked about a trial myringotomy to see if there is any effusion that cannot be appreciated through the tympanic membrane.  She would prefer to try the drops first and I will see her back in a few weeks, sooner as needed        Demario Iraheta MD

## 2025-01-20 ENCOUNTER — APPOINTMENT (OUTPATIENT)
Dept: RADIOLOGY | Facility: HOSPITAL | Age: 70
End: 2025-01-20
Payer: MEDICARE

## 2025-01-24 ENCOUNTER — HOSPITAL ENCOUNTER (OUTPATIENT)
Dept: RADIOLOGY | Facility: HOSPITAL | Age: 70
Discharge: HOME | End: 2025-01-24
Payer: MEDICARE

## 2025-01-24 DIAGNOSIS — R92.8 ABNORMAL MAMMOGRAM: ICD-10-CM

## 2025-01-24 PROCEDURE — 76982 USE 1ST TARGET LESION: CPT | Mod: RT

## 2025-01-24 PROCEDURE — 76642 ULTRASOUND BREAST LIMITED: CPT | Mod: RT

## 2025-01-29 ENCOUNTER — APPOINTMENT (OUTPATIENT)
Dept: SURGERY | Facility: CLINIC | Age: 70
End: 2025-01-29
Payer: MEDICARE

## 2025-01-29 VITALS
DIASTOLIC BLOOD PRESSURE: 81 MMHG | WEIGHT: 132 LBS | SYSTOLIC BLOOD PRESSURE: 145 MMHG | HEART RATE: 62 BPM | TEMPERATURE: 97 F | HEIGHT: 61 IN | BODY MASS INDEX: 24.92 KG/M2

## 2025-01-29 DIAGNOSIS — D24.1 INTRADUCTAL PAPILLOMA OF RIGHT BREAST: Primary | ICD-10-CM

## 2025-01-29 DIAGNOSIS — R92.8 ABNORMAL MAMMOGRAM OF RIGHT BREAST: ICD-10-CM

## 2025-01-29 PROCEDURE — 1159F MED LIST DOCD IN RCRD: CPT | Performed by: SURGERY

## 2025-01-29 PROCEDURE — 99214 OFFICE O/P EST MOD 30 MIN: CPT | Performed by: SURGERY

## 2025-01-29 PROCEDURE — 1036F TOBACCO NON-USER: CPT | Performed by: SURGERY

## 2025-01-29 PROCEDURE — 3008F BODY MASS INDEX DOCD: CPT | Performed by: SURGERY

## 2025-01-29 PROCEDURE — 1160F RVW MEDS BY RX/DR IN RCRD: CPT | Performed by: SURGERY

## 2025-01-29 RX ORDER — CEFAZOLIN SODIUM 2 G/100ML
2 INJECTION, SOLUTION INTRAVENOUS ONCE
OUTPATIENT
Start: 2025-01-29 | End: 2025-01-29

## 2025-01-29 RX ORDER — CELECOXIB 50 MG/1
200 CAPSULE ORAL ONCE
OUTPATIENT
Start: 2025-01-29 | End: 2025-01-29

## 2025-01-29 RX ORDER — ACETAMINOPHEN 325 MG/1
975 TABLET ORAL ONCE
OUTPATIENT
Start: 2025-01-29 | End: 2025-01-29

## 2025-01-29 NOTE — PATIENT INSTRUCTIONS
You have an intraductal papilloma of the right breast.  As we discussed it is increased in size and the recommendations made to remove this.  We will perform a Magseed lumpectomy on February 25, 2025.  You will require Magseed placement prior to this and I have ordered this.  Your procedure will be performed on February 25, 2025.  My office will provide you with preprocedure instructions.

## 2025-01-29 NOTE — H&P (VIEW-ONLY)
"Subjective   Patient ID: Camilla Lal \"Osei" is a 69 y.o. female who presents for follow-up of ultrasonic abnormality of the right breast    HPI   This patient underwent image guided biopsy of a mammographic abnormality of the right breast 1/3/2024 this confirmed a intraductal papilloma with usual ductal hyperplasia and no atypia.  Based on the current guidelines it was felt appropriate recommend conservative care and the patient wished to be followed conservatively.  Recommendations made for subsequent yearly follow-up ultrasound and mammogram.  Mammogram performed in 1/9/2025 confirmed an increase in size of the mass.  Ultrasound performed 1/24/2025 confirmed enlargement of the complex lesion with recommendation for excision of the mass.  Past Medical History:   Diagnosis Date    Colon cancer (Multi)         Current Outpatient Medications on File Prior to Visit   Medication Sig Dispense Refill    aspirin 81 mg EC tablet Take 1 tablet (81 mg) by mouth once daily.      estradiol (Estrace) 0.01 % (0.1 mg/gram) vaginal cream Apply pea-sized amount of cream to affected area on Monday Wednesday and Friday for 2 wks, then taper to as needed use. 42.5 g 3    fluocinolone (DermOtic) 0.01 % ear drops 4 drops to affected ear as directed 20 mL 1    levothyroxine (Synthroid, Levoxyl) 88 mcg tablet TAKE 1 TABLET BY MOUTH ONCE DAILY ON AN EMPTY STOMACH (Patient not taking: Reported on 1/14/2025) 30 tablet 11    LORazepam (Ativan) 0.5 mg tablet Take 1 tablet (0.5 mg) by mouth once daily as needed for anxiety. 30 tablet 2    mv-min/iron/folic/calcium/vitK (WOMEN'S MULTIVITAMIN ORAL) once every 24 hours.       No current facility-administered medications on file prior to visit.        Review of Systems   All other systems reviewed and are negative.        Vitals:    01/29/25 0920   BP: 145/81   Pulse: 62   Temp: 36.1 °C (97 °F)        Objective     Physical Exam  Constitutional:       Appearance: Normal appearance. "   Cardiovascular:      Heart sounds: Normal heart sounds.   Pulmonary:      Breath sounds: Normal breath sounds and air entry.   Chest:   Breasts:     Right: Normal.      Left: Normal.          Comments: Questionable palpable abnormality right breast  Abdominal:      General: Abdomen is flat.      Palpations: Abdomen is soft.      Tenderness: There is no abdominal tenderness.   Neurological:      Mental Status: She is alert.         Review of mammogram performed 1/9/2025  IMPRESSION:  1. Small 9 mm mass in the right breast at 9 o'clock with the  associated biopsy clip appears to have increased since the prior  exam. Recommend further evaluation with targeted right breast  ultrasound.  Review of ultrasound performed 1/25/2025  IMPRESSION:  An up to 14 mm anterolateral complex lesion had a benign core biopsy  in January 2024, but has enlarged including an up to 10 mm solid  component that was 6 mm in December 2023. Excision of it should be  considered.    Problem List Items Addressed This Visit       Abnormal mammogram of right breast    Intraductal papilloma of right breast - Primary        Assessment/Plan     RIGHT MAGSEED LUMPECTOMY 2.25.25 .  Risks include, but not limited to pain, infection, bleeding,  risk of positive margins, need for re-excision, risk of cardiac, pulmonary, neurologic, locomotor, anesthetic events, and other unforeseen complications including death.    You  will have a Magseed placed prior to your operation.    Theodore Case MD

## 2025-02-04 ENCOUNTER — HOSPITAL ENCOUNTER (OUTPATIENT)
Dept: RADIOLOGY | Facility: HOSPITAL | Age: 70
Discharge: HOME | End: 2025-02-04
Payer: MEDICARE

## 2025-02-04 ENCOUNTER — APPOINTMENT (OUTPATIENT)
Dept: OTOLARYNGOLOGY | Facility: CLINIC | Age: 70
End: 2025-02-04
Payer: MEDICARE

## 2025-02-04 VITALS — WEIGHT: 130 LBS | TEMPERATURE: 96.9 F | HEIGHT: 61 IN | BODY MASS INDEX: 24.55 KG/M2

## 2025-02-04 DIAGNOSIS — B99.9 INFECTION: ICD-10-CM

## 2025-02-04 DIAGNOSIS — H62.40 OTOMYCOSIS: ICD-10-CM

## 2025-02-04 DIAGNOSIS — B36.9 OTOMYCOSIS: ICD-10-CM

## 2025-02-04 DIAGNOSIS — H93.11 TINNITUS OF RIGHT EAR: Primary | ICD-10-CM

## 2025-02-04 DIAGNOSIS — H93.11 TINNITUS OF RIGHT EAR: ICD-10-CM

## 2025-02-04 PROCEDURE — 3008F BODY MASS INDEX DOCD: CPT | Performed by: OTOLARYNGOLOGY

## 2025-02-04 PROCEDURE — 1036F TOBACCO NON-USER: CPT | Performed by: OTOLARYNGOLOGY

## 2025-02-04 PROCEDURE — 1159F MED LIST DOCD IN RCRD: CPT | Performed by: OTOLARYNGOLOGY

## 2025-02-04 PROCEDURE — 99214 OFFICE O/P EST MOD 30 MIN: CPT | Performed by: OTOLARYNGOLOGY

## 2025-02-04 PROCEDURE — 70480 CT ORBIT/EAR/FOSSA W/O DYE: CPT

## 2025-02-04 PROCEDURE — 70480 CT ORBIT/EAR/FOSSA W/O DYE: CPT | Performed by: RADIOLOGY

## 2025-02-04 PROCEDURE — 1160F RVW MEDS BY RX/DR IN RCRD: CPT | Performed by: OTOLARYNGOLOGY

## 2025-02-04 RX ORDER — FLUCONAZOLE 100 MG/1
100 TABLET ORAL DAILY
Qty: 7 TABLET | Refills: 0 | Status: SHIPPED | OUTPATIENT
Start: 2025-02-04 | End: 2025-02-11

## 2025-02-04 RX ORDER — CLOTRIMAZOLE AND BETAMETHASONE DIPROPIONATE 10; .5 MG/ML; MG/ML
LOTION TOPICAL
Qty: 30 ML | Refills: 0 | Status: SHIPPED | OUTPATIENT
Start: 2025-02-04

## 2025-02-04 NOTE — PROGRESS NOTES
"CHANELL Lal \"Ramona\" is a 69 y.o. female follow-up right intermittent plugging sensation.  We talked more today and she has been having some intermittent pulsatile tinnitus on the right as well.  It seems to be positional.  She feels more blocked today despite using the fluocinolone over the last 2 weeks or so.  Denies otalgia    Prior history: Presents bothered by intermittent plugging sensation in the right ear.  Denies otalgia and otorrhea.  Had an audiogram with bilateral essentially symmetric sensorineural hearing loss with shallow tympanograms bilaterally.      Past Medical History:   Diagnosis Date    Colon cancer (Multi)             Medications:     Current Outpatient Medications:     aspirin 81 mg EC tablet, Take 1 tablet (81 mg) by mouth once daily., Disp: , Rfl:     estradiol (Estrace) 0.01 % (0.1 mg/gram) vaginal cream, Apply pea-sized amount of cream to affected area on Monday Wednesday and Friday for 2 wks, then taper to as needed use., Disp: 42.5 g, Rfl: 3    fluocinolone (DermOtic) 0.01 % ear drops, 4 drops to affected ear as directed, Disp: 20 mL, Rfl: 1    levothyroxine (Synthroid, Levoxyl) 88 mcg tablet, TAKE 1 TABLET BY MOUTH ONCE DAILY ON AN EMPTY STOMACH, Disp: 30 tablet, Rfl: 11    LORazepam (Ativan) 0.5 mg tablet, Take 1 tablet (0.5 mg) by mouth once daily as needed for anxiety., Disp: 30 tablet, Rfl: 2    mv-min/iron/folic/calcium/vitK (WOMEN'S MULTIVITAMIN ORAL), once every 24 hours., Disp: , Rfl:     clotrimazole-betamethasone (Lotrisone) lotion, Paint affected ear as directed twice daily x 14 days, Disp: 30 mL, Rfl: 0    fluconazole (Diflucan) 100 mg tablet, Take 1 tablet (100 mg) by mouth once daily for 7 days., Disp: 7 tablet, Rfl: 0     Allergies:  Allergies   Allergen Reactions    Codeine Other and Nausea/vomiting     vomitting    Scopolamine Hallucinations        Physical Exam:  Last Recorded Vitals  Temperature 36.1 °C (96.9 °F), height 1.549 m (5' 1\"), weight 59 kg (130 " lb).  General:     General appearance: Well-developed, well-nourished in no acute distress.       Voice:  normal       Head/face: Normal appearance; nontender to palpation     Facial nerve function: Normal and symmetric bilaterally.    Oral/oropharynx:     Oral vestibule: Normal labial and gingival mucosa     Tongue/floor of mouth: Normal without lesion     Oropharynx: Clear.  No lesions present of the hard/soft palate, posterior pharynx    Neck:     Neck: Normal appearance, trachea midline     Salivary glands: Normal to palpation bilaterally     Lymph nodes: No cervical lymphadenopathy to palpation     Thyroid: No thyromegaly.  No palpable nodules     Range of motion: Normal    Neurological:     Cortical functions: Alert and oriented x3, appropriate affect       Larynx/hypopharynx:     Laryngeal findings: Mirror exam inadequate or limited secondary to enlarged base of tongue and/or excessive gagging    Ear:     Ear canal: Normal left.  Right with classic medial fungal debris, suctioned.  Superiorly and medially there appears to be some pulsatile depression of the medial ear canal.  The pulsations are subtle but present     Tympanic membrane: Intact and mobile bilaterally.  After cleaning the fungal debris on the right, the tympanic membrane is actually a bit less thickened.  The middle ear appears aerated.     Pinna: Normal bilaterally     Hearing:  Gross hearing assessment normal by voice    Nose:     Visualized using: Anterior rhinoscopy     Nasopharynx: Inadequate mirror exam secondary to gag, anatomy.       Nasal dorsum: Nontraumatic midline appearance     Septum: Midline     Inferior turbinates: Normally sized     Mucosa: Bilateral, pink, normal appearing       ASSESSMENT/PLAN:  There is no question today that otomycosis is present of the right medial ear canal.  She has underlying dermatitis.  What appears new today that was not well-seen at her last visit is pulsation of the superior medial ear canal.  I  have recommended a CT scan of the temporal bone to evaluate for thinning of the bony external auditory canal and possibility of encephalocele or other explanation.  Clotrimazole and Diflucan were ordered for her ear findings and I will see her back in a week for repeat cleaning and review of the CT        Demario Iraheta MD

## 2025-02-11 ENCOUNTER — APPOINTMENT (OUTPATIENT)
Dept: OTOLARYNGOLOGY | Facility: CLINIC | Age: 70
End: 2025-02-11
Payer: MEDICARE

## 2025-02-11 ENCOUNTER — ANESTHESIA EVENT (OUTPATIENT)
Dept: OPERATING ROOM | Facility: HOSPITAL | Age: 70
End: 2025-02-11
Payer: MEDICARE

## 2025-02-11 ENCOUNTER — PRE-ADMISSION TESTING (OUTPATIENT)
Dept: PREADMISSION TESTING | Facility: HOSPITAL | Age: 70
End: 2025-02-11
Payer: MEDICARE

## 2025-02-11 VITALS
SYSTOLIC BLOOD PRESSURE: 130 MMHG | OXYGEN SATURATION: 100 % | TEMPERATURE: 98.6 F | RESPIRATION RATE: 16 BRPM | DIASTOLIC BLOOD PRESSURE: 70 MMHG | HEART RATE: 68 BPM

## 2025-02-11 VITALS — BODY MASS INDEX: 24.55 KG/M2 | HEIGHT: 61 IN | TEMPERATURE: 96.9 F | WEIGHT: 130 LBS

## 2025-02-11 DIAGNOSIS — R19.04 LEFT LOWER QUADRANT ABDOMINAL MASS: ICD-10-CM

## 2025-02-11 DIAGNOSIS — B36.9 OTOMYCOSIS: Primary | ICD-10-CM

## 2025-02-11 DIAGNOSIS — Z01.818 PREOP TESTING: ICD-10-CM

## 2025-02-11 DIAGNOSIS — H62.40 OTOMYCOSIS: Primary | ICD-10-CM

## 2025-02-11 PROBLEM — Z85.038 HISTORY OF COLON CANCER: Status: ACTIVE | Noted: 2025-02-11

## 2025-02-11 PROBLEM — F41.9 ANXIETY: Status: ACTIVE | Noted: 2025-02-11

## 2025-02-11 PROBLEM — E03.9 HYPOTHYROIDISM: Status: ACTIVE | Noted: 2025-02-11

## 2025-02-11 PROCEDURE — 3008F BODY MASS INDEX DOCD: CPT | Performed by: OTOLARYNGOLOGY

## 2025-02-11 PROCEDURE — 1036F TOBACCO NON-USER: CPT | Performed by: OTOLARYNGOLOGY

## 2025-02-11 PROCEDURE — 99213 OFFICE O/P EST LOW 20 MIN: CPT | Performed by: OTOLARYNGOLOGY

## 2025-02-11 PROCEDURE — 1159F MED LIST DOCD IN RCRD: CPT | Performed by: OTOLARYNGOLOGY

## 2025-02-11 PROCEDURE — 1160F RVW MEDS BY RX/DR IN RCRD: CPT | Performed by: OTOLARYNGOLOGY

## 2025-02-11 RX ORDER — CHLORHEXIDINE GLUCONATE ORAL RINSE 1.2 MG/ML
15 SOLUTION DENTAL DAILY
Qty: 30 ML | Refills: 0 | Status: SHIPPED | OUTPATIENT
Start: 2025-02-11 | End: 2025-02-13

## 2025-02-11 RX ORDER — CHLORHEXIDINE GLUCONATE 40 MG/ML
SOLUTION TOPICAL DAILY
Qty: 354 ML | Refills: 0 | Status: SHIPPED | OUTPATIENT
Start: 2025-02-11 | End: 2025-02-16

## 2025-02-11 ASSESSMENT — DUKE ACTIVITY SCORE INDEX (DASI)
CAN YOU WALK INDOORS, SUCH AS AROUND YOUR HOUSE: YES
CAN YOU TAKE CARE OF YOURSELF (EAT, DRESS, BATHE, OR USE TOILET): YES
CAN YOU DO MODERATE WORK AROUND THE HOUSE LIKE VACUUMING, SWEEPING FLOORS OR CARRYING GROCERIES: YES
CAN YOU HAVE SEXUAL RELATIONS: YES
CAN YOU CLIMB A FLIGHT OF STAIRS OR WALK UP A HILL: YES
CAN YOU DO LIGHT WORK AROUND THE HOUSE LIKE DUSTING OR WASHING DISHES: YES
CAN YOU PARTICIPATE IN MODERATE RECREATIONAL ACTIVITIES LIKE GOLF, BOWLING, DANCING, DOUBLES TENNIS OR THROWING A BASEBALL OR FOOTBALL: YES
CAN YOU PARTICIPATE IN STRENOUS SPORTS LIKE SWIMMING, SINGLES TENNIS, FOOTBALL, BASKETBALL, OR SKIING: NO
CAN YOU RUN A SHORT DISTANCE: YES
CAN YOU WALK A BLOCK OR TWO ON LEVEL GROUND: YES
CAN YOU DO YARD WORK LIKE RAKING LEAVES, WEEDING OR PUSHING A MOWER: YES

## 2025-02-11 ASSESSMENT — PAIN SCALES - GENERAL: PAINLEVEL_OUTOF10: 0 - NO PAIN

## 2025-02-11 ASSESSMENT — PAIN - FUNCTIONAL ASSESSMENT: PAIN_FUNCTIONAL_ASSESSMENT: 0-10

## 2025-02-11 NOTE — ANESTHESIA PREPROCEDURE EVALUATION
"Patient: Camilla Lal \"Ramona\"    Procedure Information       Date/Time: 02/25/25 1130    Procedure: BIOPSY, BREAST, WITH MAGNETIC SEED LOCALIZATION (Right)    Location: [facility] / [facility]    Surgeons: Theodore Case MD            Relevant Problems   Anesthesia (within normal limits)      Cardiac (within normal limits)      Pulmonary (within normal limits)      Neuro   (+) Anxiety      GI (within normal limits)      /Renal (within normal limits)      Liver (within normal limits)      Endocrine   (+) Hypothyroidism      Hematology (within normal limits)      Musculoskeletal (within normal limits)      HEENT (within normal limits)      ID (within normal limits)      Skin (within normal limits)      GYN (within normal limits)      Digestive   (+) History of colon cancer      Genitourinary and Reproductive   (+) Intraductal papilloma of right breast     There were no vitals filed for this visit.    Past Surgical History:   Procedure Laterality Date   • BREAST BIOPSY Right     benign   • ECTOPIC PREGNANCY SURGERY     • OTHER SURGICAL HISTORY  09/26/2022    Cholecystectomy laparoscopic   • OTHER SURGICAL HISTORY  09/26/2022    Colon surgery   • OTHER SURGICAL HISTORY  09/26/2022    Tonsillectomy     Past Medical History:   Diagnosis Date   • Colon cancer (Multi)      No current facility-administered medications for this encounter.    Current Outpatient Medications:   •  aspirin 81 mg EC tablet, Take 1 tablet (81 mg) by mouth once daily., Disp: , Rfl:   •  clotrimazole-betamethasone (Lotrisone) lotion, Paint affected ear as directed twice daily x 14 days, Disp: 30 mL, Rfl: 0  •  estradiol (Estrace) 0.01 % (0.1 mg/gram) vaginal cream, Apply pea-sized amount of cream to affected area on Monday Wednesday and Friday for 2 wks, then taper to as needed use., Disp: 42.5 g, Rfl: 3  •  fluconazole (Diflucan) 100 mg tablet, Take 1 tablet (100 mg) by mouth once daily for 7 days., Disp: 7 tablet, Rfl: 0  •  " fluocinolone (DermOtic) 0.01 % ear drops, 4 drops to affected ear as directed, Disp: 20 mL, Rfl: 1  •  levothyroxine (Synthroid, Levoxyl) 88 mcg tablet, TAKE 1 TABLET BY MOUTH ONCE DAILY ON AN EMPTY STOMACH, Disp: 30 tablet, Rfl: 11  •  LORazepam (Ativan) 0.5 mg tablet, Take 1 tablet (0.5 mg) by mouth once daily as needed for anxiety., Disp: 30 tablet, Rfl: 2  •  mv-min/iron/folic/calcium/vitK (WOMEN'S MULTIVITAMIN ORAL), once every 24 hours., Disp: , Rfl:   Prior to Admission medications    Medication Sig Start Date End Date Taking? Authorizing Provider   aspirin 81 mg EC tablet Take 1 tablet (81 mg) by mouth once daily.    Historical Provider, MD   clotrimazole-betamethasone (Lotrisone) lotion Paint affected ear as directed twice daily x 14 days 2/4/25   Demario Iraheta MD   estradiol (Estrace) 0.01 % (0.1 mg/gram) vaginal cream Apply pea-sized amount of cream to affected area on Monday Wednesday and Friday for 2 wks, then taper to as needed use. 12/9/24   Nelly Pelaez, EMILY-CNM, ND   fluconazole (Diflucan) 100 mg tablet Take 1 tablet (100 mg) by mouth once daily for 7 days. 2/4/25 2/11/25  Demario Iraheta MD   fluocinolone (DermOtic) 0.01 % ear drops 4 drops to affected ear as directed 1/14/25   Demario Iraheta MD   levothyroxine (Synthroid, Levoxyl) 88 mcg tablet TAKE 1 TABLET BY MOUTH ONCE DAILY ON AN EMPTY STOMACH 11/21/24   Ander Siu,    LORazepam (Ativan) 0.5 mg tablet Take 1 tablet (0.5 mg) by mouth once daily as needed for anxiety. 12/13/24   Ander Siu DO   mv-min/iron/folic/calcium/vitK (WOMEN'S MULTIVITAMIN ORAL) once every 24 hours.    Historical Provider, MD     Allergies   Allergen Reactions   • Codeine Other and Nausea/vomiting     vomitting   • Scopolamine Hallucinations     Social History     Tobacco Use   • Smoking status: Former     Current packs/day: 0.00     Average packs/day: 1 pack/day for 15.0 years (15.0 ttl pk-yrs)     Types: Cigarettes     Start date: 1/1/1988     Quit date:  "2003     Years since quittin.1   • Smokeless tobacco: Never   Substance Use Topics   • Alcohol use: Not Currently         Chemistry    Lab Results   Component Value Date/Time     2024 1052    K 4.4 2024 1052     2024 1052    CO2 28 2024 1052    BUN 14 2024 1052    CREATININE 0.90 2024 1052    Lab Results   Component Value Date/Time    CALCIUM 9.9 2024 1052    ALKPHOS 99 2024 1052    AST 18 2024 1052    ALT 13 2024 1052    BILITOT 0.9 2024 1052          Lab Results   Component Value Date/Time    WBC 5.8 2024 1052    HGB 14.8 2024 1052    HCT 47.0 (H) 2024 1052     2024 1052     No results found for: \"PROTIME\", \"PTT\", \"INR\"  No results found for this or any previous visit (from the past 4464 hours).  No results found for this or any previous visit from the past 1095 days.    Clinical information reviewed:                 Chart reviewed.  No clearances ordered.  Patient states she has \"hallucinations\" after anesthesia.    NPO Detail:  No data recorded     Physical Exam    Airway  Mallampati: II  TM distance: >3 FB  Neck ROM: full     Cardiovascular - normal exam     Dental - normal exam     Pulmonary - normal exam     Abdominal - normal exam         Anesthesia Plan    History of general anesthesia?: yes  History of complications of general anesthesia?: no    ASA 2     general     The patient is not a current smoker.  Patient was not previously instructed to abstain from smoking on day of procedure.  Patient did not smoke on day of procedure.    intravenous induction   Postoperative administration of opioids is intended.  Anesthetic plan and risks discussed with patient.  Use of blood products discussed with patient who consented to blood products.    Plan discussed with CRNA.  "

## 2025-02-11 NOTE — PROGRESS NOTES
"CHANELL Lal \"Ramona\" is a 69-year-old female follow-up right otomycosis.  We did CT scan of the temporal bone to evaluate for source of superior canal pulsation and the skin of the canal was thickened but there was no bone dehiscence or encephalocele.  She feels normal today.  She is hearing well.  No pain.  No fullness.    Prior history:follow-up right intermittent plugging sensation.  We talked more today and she has been having some intermittent pulsatile tinnitus on the right as well.  It seems to be positional.  She feels more blocked today despite using the fluocinolone over the last 2 weeks or so.  Denies otalgia    Prior history: Presents bothered by intermittent plugging sensation in the right ear.  Denies otalgia and otorrhea.  Had an audiogram with bilateral essentially symmetric sensorineural hearing loss with shallow tympanograms bilaterally.      Past Medical History:   Diagnosis Date    Adverse effect of anesthesia     Colon cancer (Multi)             Medications:     Current Outpatient Medications:     aspirin 81 mg EC tablet, Take 1 tablet (81 mg) by mouth once daily., Disp: , Rfl:     chlorhexidine (Hibiclens) 4 % external liquid, Apply topically once daily for 5 days., Disp: 354 mL, Rfl: 0    chlorhexidine (Peridex) 0.12 % solution, Use 15 mL in the mouth or throat once daily for 2 days. Once the night before surgery and once the morning of, Disp: 30 mL, Rfl: 0    clotrimazole-betamethasone (Lotrisone) lotion, Paint affected ear as directed twice daily x 14 days, Disp: 30 mL, Rfl: 0    estradiol (Estrace) 0.01 % (0.1 mg/gram) vaginal cream, Apply pea-sized amount of cream to affected area on Monday Wednesday and Friday for 2 wks, then taper to as needed use., Disp: 42.5 g, Rfl: 3    levothyroxine (Synthroid, Levoxyl) 88 mcg tablet, TAKE 1 TABLET BY MOUTH ONCE DAILY ON AN EMPTY STOMACH, Disp: 30 tablet, Rfl: 11    LORazepam (Ativan) 0.5 mg tablet, Take 1 tablet (0.5 mg) by mouth once " "daily as needed for anxiety., Disp: 30 tablet, Rfl: 2    mv-min/iron/folic/calcium/vitK (WOMEN'S MULTIVITAMIN ORAL), once every 24 hours., Disp: , Rfl:      Allergies:  Allergies   Allergen Reactions    Codeine Other and Nausea/vomiting     vomitting    Scopolamine Hallucinations        Physical Exam:  Last Recorded Vitals  Temperature 36.1 °C (96.9 °F), height 1.549 m (5' 1\"), weight 59 kg (130 lb).  General:     General appearance: Well-developed, well-nourished in no acute distress.       Voice:  normal       Head/face: Normal appearance; nontender to palpation     Facial nerve function: Normal and symmetric bilaterally.    Oral/oropharynx:     Oral vestibule: Normal labial and gingival mucosa     Tongue/floor of mouth: Normal without lesion     Oropharynx: Clear.  No lesions present of the hard/soft palate, posterior pharynx    Neck:     Neck: Normal appearance, trachea midline     Salivary glands: Normal to palpation bilaterally     Lymph nodes: No cervical lymphadenopathy to palpation     Thyroid: No thyromegaly.  No palpable nodules     Range of motion: Normal    Neurological:     Cortical functions: Alert and oriented x3, appropriate affect       Larynx/hypopharynx:     Laryngeal findings: Mirror exam inadequate or limited secondary to enlarged base of tongue and/or excessive gagging    Ear:     Ear canal: Normal left.  Right has completely normalized     tympanic membrane: Intact and mobile bilaterally.  Middle ear aerated bilaterally.  No pulsation on either side     pinna: Normal bilaterally     Hearing:  Gross hearing assessment normal by voice    Nose:     Visualized using: Anterior rhinoscopy     Nasopharynx: Inadequate mirror exam secondary to gag, anatomy.       Nasal dorsum: Nontraumatic midline appearance     Septum: Midline     Inferior turbinates: Normally sized     Mucosa: Bilateral, pink, normal appearing       ASSESSMENT/PLAN:  Otomycosis appears to be resolved.  Her symptoms are resolved.  " Out of precaution, the canal was painted with gentian violet powdered once with boric acid powder.  She will continue the lotion for the rest of the week and call with any recurrence of symptoms      Demario Iraheta MD

## 2025-02-11 NOTE — PREPROCEDURE INSTRUCTIONS
Medication List            Accurate as of February 11, 2025  7:28 AM. Always use your most recent med list.                aspirin 81 mg EC tablet  Medication Adjustments for Surgery: Do Not take on the morning of surgery     clotrimazole-betamethasone lotion  Commonly known as: Lotrisone  Paint affected ear as directed twice daily x 14 days  Medication Adjustments for Surgery: Do Not take on the morning of surgery     estradiol 0.01 % (0.1 mg/gram) vaginal cream  Commonly known as: Estrace  Apply pea-sized amount of cream to affected area on Monday Wednesday and Friday for 2 wks, then taper to as needed use.  Medication Adjustments for Surgery: Do Not take on the morning of surgery     fluconazole 100 mg tablet  Commonly known as: Diflucan  Take 1 tablet (100 mg) by mouth once daily for 7 days.  Medication Adjustments for Surgery: Do Not take on the morning of surgery     fluocinolone 0.01 % ear drops  Commonly known as: DermOtic  4 drops to affected ear as directed  Medication Adjustments for Surgery: Take/Use as prescribed     levothyroxine 88 mcg tablet  Commonly known as: Synthroid, Levoxyl  TAKE 1 TABLET BY MOUTH ONCE DAILY ON AN EMPTY STOMACH  Medication Adjustments for Surgery: Take on the morning of surgery     LORazepam 0.5 mg tablet  Commonly known as: Ativan  Take 1 tablet (0.5 mg) by mouth once daily as needed for anxiety.  Medication Adjustments for Surgery: Take/Use as prescribed     WOMEN'S MULTIVITAMIN ORAL  Medication Adjustments for Surgery: Do Not take on the morning of surgery            NPO Instructions:    You may have clear liquids, gum and hard candy up to THREE HOURS prior to your procedure-Do not have anything after this time     Only drink the following clear liquids after midnight the night before your procedure: water, black coffee or coffee with sugar, tea, Gatorade or Clear Soda-Ex. Gingerale or Sprite     Additional Instructions:     Review your medication instructions, take  indicated medications  Wear  comfortable loose fitting clothing  Do not use moisturizers, creams, lotions or perfume  All jewelry and valuables should be left at home  We will call you the business day before your procedure between 1-3p to confirm your procedure and arrival time     Please park in the back of the hospital, in the ED parking and proceed to the second floor. This is through the ED waiting room and take elevator to the second floor. When off the elevator you will check in the OUTPATIENT department on the left.     Please keep in mind the construction on [address]     Please make arrangements to have a responsible adult take you home and stay with you. NO DRIVING FOR 24 HOURS.     Please bring your ID and insurance card to your procedure     If you get ill at all before your procedure- CALL YOUR DOCTOR/SURGEON. We want you in the best shape that is possible. Any sickness might lead to your procedure being delayed.     Please shower or bathe the morning of your procedure with antibacterial soap. You may receive instructions for Hibiclens shower.     Shower as you normally would do     Use soap from neck down, focusing on area that is having the surgery (avoid eyes and genitals)     Turn water off and let dry for three minutes.     Turn water back on and rinse off     When checked in to the outpatient unit, you will be asked to change into a hospital gown and remove all clothing, including underwear     An IV will be inserted      Your family or  will be asked to wait in the waiting room or cafeteria and will be notified when able to come sit with you     Typical recovery time will be aprox 60 - 90 minutes in PACU. Please let us know if you are having pain or nausea so we can assist you.     Please call [phone] with any further questions

## 2025-02-21 ENCOUNTER — HOSPITAL ENCOUNTER (OUTPATIENT)
Dept: RADIOLOGY | Facility: HOSPITAL | Age: 70
Discharge: HOME | End: 2025-02-21
Payer: MEDICARE

## 2025-02-21 DIAGNOSIS — R92.8 ABNORMAL MAMMOGRAM OF RIGHT BREAST: ICD-10-CM

## 2025-02-21 DIAGNOSIS — D24.1 INTRADUCTAL PAPILLOMA OF RIGHT BREAST: ICD-10-CM

## 2025-02-21 PROCEDURE — 2500000004 HC RX 250 GENERAL PHARMACY W/ HCPCS (ALT 636 FOR OP/ED): Performed by: RADIOLOGY

## 2025-02-21 PROCEDURE — 2780000003 HC OR 278 NO HCPCS

## 2025-02-21 PROCEDURE — 19285 PERQ DEV BREAST 1ST US IMAG: CPT | Mod: RT

## 2025-02-21 PROCEDURE — A4648 IMPLANTABLE TISSUE MARKER: HCPCS

## 2025-02-21 RX ORDER — LIDOCAINE HYDROCHLORIDE 10 MG/ML
INJECTION, SOLUTION EPIDURAL; INFILTRATION; INTRACAUDAL; PERINEURAL AS NEEDED
Status: COMPLETED | OUTPATIENT
Start: 2025-02-21 | End: 2025-02-21

## 2025-02-21 RX ADMIN — LIDOCAINE HYDROCHLORIDE 5 ML: 10 INJECTION, SOLUTION EPIDURAL; INFILTRATION; INTRACAUDAL; PERINEURAL at 11:41

## 2025-02-25 ENCOUNTER — HOSPITAL ENCOUNTER (OUTPATIENT)
Facility: HOSPITAL | Age: 70
Setting detail: OUTPATIENT SURGERY
Discharge: HOME | End: 2025-02-25
Attending: SURGERY | Admitting: SURGERY
Payer: MEDICARE

## 2025-02-25 ENCOUNTER — APPOINTMENT (OUTPATIENT)
Dept: RADIOLOGY | Facility: HOSPITAL | Age: 70
End: 2025-02-25
Payer: MEDICARE

## 2025-02-25 ENCOUNTER — ANESTHESIA (OUTPATIENT)
Dept: OPERATING ROOM | Facility: HOSPITAL | Age: 70
End: 2025-02-25
Payer: MEDICARE

## 2025-02-25 ENCOUNTER — PHARMACY VISIT (OUTPATIENT)
Dept: PHARMACY | Facility: CLINIC | Age: 70
End: 2025-02-25
Payer: COMMERCIAL

## 2025-02-25 VITALS
WEIGHT: 130 LBS | OXYGEN SATURATION: 100 % | BODY MASS INDEX: 24.55 KG/M2 | HEIGHT: 61 IN | DIASTOLIC BLOOD PRESSURE: 86 MMHG | HEART RATE: 48 BPM | TEMPERATURE: 97.9 F | RESPIRATION RATE: 17 BRPM | SYSTOLIC BLOOD PRESSURE: 166 MMHG

## 2025-02-25 DIAGNOSIS — D24.1 INTRADUCTAL PAPILLOMA OF RIGHT BREAST: Primary | ICD-10-CM

## 2025-02-25 DIAGNOSIS — R92.8 ABNORMAL MAMMOGRAM OF RIGHT BREAST: ICD-10-CM

## 2025-02-25 PROCEDURE — 3600000003 HC OR TIME - INITIAL BASE CHARGE - PROCEDURE LEVEL THREE: Performed by: SURGERY

## 2025-02-25 PROCEDURE — 7100000001 HC RECOVERY ROOM TIME - INITIAL BASE CHARGE: Performed by: SURGERY

## 2025-02-25 PROCEDURE — 2500000002 HC RX 250 W HCPCS SELF ADMINISTERED DRUGS (ALT 637 FOR MEDICARE OP, ALT 636 FOR OP/ED): Performed by: NURSE ANESTHETIST, CERTIFIED REGISTERED

## 2025-02-25 PROCEDURE — 7100000009 HC PHASE TWO TIME - INITIAL BASE CHARGE: Performed by: SURGERY

## 2025-02-25 PROCEDURE — 76098 X-RAY EXAM SURGICAL SPECIMEN: CPT

## 2025-02-25 PROCEDURE — 7100000002 HC RECOVERY ROOM TIME - EACH INCREMENTAL 1 MINUTE: Performed by: SURGERY

## 2025-02-25 PROCEDURE — 2500000004 HC RX 250 GENERAL PHARMACY W/ HCPCS (ALT 636 FOR OP/ED): Performed by: SURGERY

## 2025-02-25 PROCEDURE — 2500000001 HC RX 250 WO HCPCS SELF ADMINISTERED DRUGS (ALT 637 FOR MEDICARE OP): Performed by: SURGERY

## 2025-02-25 PROCEDURE — 2500000005 HC RX 250 GENERAL PHARMACY W/O HCPCS

## 2025-02-25 PROCEDURE — 3600000008 HC OR TIME - EACH INCREMENTAL 1 MINUTE - PROCEDURE LEVEL THREE: Performed by: SURGERY

## 2025-02-25 PROCEDURE — 3700000001 HC GENERAL ANESTHESIA TIME - INITIAL BASE CHARGE: Performed by: SURGERY

## 2025-02-25 PROCEDURE — 2500000004 HC RX 250 GENERAL PHARMACY W/ HCPCS (ALT 636 FOR OP/ED): Performed by: NURSE ANESTHETIST, CERTIFIED REGISTERED

## 2025-02-25 PROCEDURE — 2720000007 HC OR 272 NO HCPCS: Performed by: SURGERY

## 2025-02-25 PROCEDURE — 76098 X-RAY EXAM SURGICAL SPECIMEN: CPT | Performed by: RADIOLOGY

## 2025-02-25 PROCEDURE — 88307 TISSUE EXAM BY PATHOLOGIST: CPT | Mod: TC,CONLAB,GENLAB | Performed by: SURGERY

## 2025-02-25 PROCEDURE — 19125 EXCISION BREAST LESION: CPT | Performed by: SURGERY

## 2025-02-25 PROCEDURE — RXMED WILLOW AMBULATORY MEDICATION CHARGE

## 2025-02-25 PROCEDURE — 7100000010 HC PHASE TWO TIME - EACH INCREMENTAL 1 MINUTE: Performed by: SURGERY

## 2025-02-25 PROCEDURE — 3700000002 HC GENERAL ANESTHESIA TIME - EACH INCREMENTAL 1 MINUTE: Performed by: SURGERY

## 2025-02-25 PROCEDURE — 87081 CULTURE SCREEN ONLY: CPT | Mod: GENLAB

## 2025-02-25 RX ORDER — ONDANSETRON HYDROCHLORIDE 2 MG/ML
INJECTION, SOLUTION INTRAVENOUS AS NEEDED
Status: DISCONTINUED | OUTPATIENT
Start: 2025-02-25 | End: 2025-02-25

## 2025-02-25 RX ORDER — LIDOCAINE HYDROCHLORIDE 20 MG/ML
INJECTION, SOLUTION INFILTRATION; PERINEURAL AS NEEDED
Status: DISCONTINUED | OUTPATIENT
Start: 2025-02-25 | End: 2025-02-25

## 2025-02-25 RX ORDER — ACETAMINOPHEN 325 MG/1
650 TABLET ORAL EVERY 6 HOURS PRN
Qty: 60 UNSPECIFIED | Refills: 0 | Status: SHIPPED | OUTPATIENT
Start: 2025-02-25 | End: 2025-03-07

## 2025-02-25 RX ORDER — APREPITANT 40 MG/1
40 CAPSULE ORAL ONCE
Status: COMPLETED | OUTPATIENT
Start: 2025-02-25 | End: 2025-02-25

## 2025-02-25 RX ORDER — FENTANYL CITRATE 50 UG/ML
25 INJECTION, SOLUTION INTRAMUSCULAR; INTRAVENOUS EVERY 5 MIN PRN
Status: DISCONTINUED | OUTPATIENT
Start: 2025-02-25 | End: 2025-02-25 | Stop reason: HOSPADM

## 2025-02-25 RX ORDER — CEFAZOLIN SODIUM 2 G/50ML
2 SOLUTION INTRAVENOUS ONCE
Status: COMPLETED | OUTPATIENT
Start: 2025-02-25 | End: 2025-02-25

## 2025-02-25 RX ORDER — IBUPROFEN 600 MG/1
600 TABLET ORAL EVERY 6 HOURS PRN
Qty: 20 TABLET | Refills: 0 | Status: SHIPPED | OUTPATIENT
Start: 2025-02-25 | End: 2025-03-07

## 2025-02-25 RX ORDER — PROPOFOL 10 MG/ML
INJECTION, EMULSION INTRAVENOUS AS NEEDED
Status: DISCONTINUED | OUTPATIENT
Start: 2025-02-25 | End: 2025-02-25

## 2025-02-25 RX ORDER — SODIUM CHLORIDE, SODIUM LACTATE, POTASSIUM CHLORIDE, CALCIUM CHLORIDE 600; 310; 30; 20 MG/100ML; MG/100ML; MG/100ML; MG/100ML
INJECTION, SOLUTION INTRAVENOUS CONTINUOUS PRN
Status: DISCONTINUED | OUTPATIENT
Start: 2025-02-25 | End: 2025-02-25

## 2025-02-25 RX ORDER — ACETAMINOPHEN 325 MG/1
650 TABLET ORAL EVERY 4 HOURS PRN
Status: DISCONTINUED | OUTPATIENT
Start: 2025-02-25 | End: 2025-02-25 | Stop reason: HOSPADM

## 2025-02-25 RX ORDER — ACETAMINOPHEN 325 MG/1
975 TABLET ORAL ONCE
Status: COMPLETED | OUTPATIENT
Start: 2025-02-25 | End: 2025-02-25

## 2025-02-25 RX ORDER — FENTANYL CITRATE 50 UG/ML
INJECTION, SOLUTION INTRAMUSCULAR; INTRAVENOUS AS NEEDED
Status: DISCONTINUED | OUTPATIENT
Start: 2025-02-25 | End: 2025-02-25

## 2025-02-25 RX ORDER — ONDANSETRON HYDROCHLORIDE 2 MG/ML
4 INJECTION, SOLUTION INTRAVENOUS ONCE AS NEEDED
Status: DISCONTINUED | OUTPATIENT
Start: 2025-02-25 | End: 2025-02-25 | Stop reason: HOSPADM

## 2025-02-25 RX ORDER — CELECOXIB 100 MG/1
200 CAPSULE ORAL ONCE
Status: COMPLETED | OUTPATIENT
Start: 2025-02-25 | End: 2025-02-25

## 2025-02-25 RX ORDER — OXYCODONE HYDROCHLORIDE 5 MG/1
5 TABLET ORAL EVERY 4 HOURS PRN
Status: DISCONTINUED | OUTPATIENT
Start: 2025-02-25 | End: 2025-02-25 | Stop reason: HOSPADM

## 2025-02-25 RX ORDER — KETOROLAC TROMETHAMINE 30 MG/ML
INJECTION, SOLUTION INTRAMUSCULAR; INTRAVENOUS AS NEEDED
Status: DISCONTINUED | OUTPATIENT
Start: 2025-02-25 | End: 2025-02-25

## 2025-02-25 RX ADMIN — POVIDONE-IODINE 1 APPLICATION: 5 SOLUTION TOPICAL at 12:22

## 2025-02-25 RX ADMIN — KETOROLAC TROMETHAMINE 30 MG: 30 INJECTION, SOLUTION INTRAMUSCULAR at 13:02

## 2025-02-25 RX ADMIN — FENTANYL CITRATE 25 MCG: 50 INJECTION, SOLUTION INTRAMUSCULAR; INTRAVENOUS at 13:07

## 2025-02-25 RX ADMIN — CEFAZOLIN SODIUM 2 G: 2 SOLUTION INTRAVENOUS at 12:47

## 2025-02-25 RX ADMIN — DEXAMETHASONE SODIUM PHOSPHATE 4 MG: 4 INJECTION, SOLUTION INTRAMUSCULAR; INTRAVENOUS at 13:02

## 2025-02-25 RX ADMIN — ONDANSETRON 4 MG: 2 INJECTION INTRAMUSCULAR; INTRAVENOUS at 13:02

## 2025-02-25 RX ADMIN — APREPITANT 40 MG: 40 CAPSULE ORAL at 12:12

## 2025-02-25 RX ADMIN — SODIUM CHLORIDE, POTASSIUM CHLORIDE, SODIUM LACTATE AND CALCIUM CHLORIDE: 600; 310; 30; 20 INJECTION, SOLUTION INTRAVENOUS at 12:47

## 2025-02-25 RX ADMIN — PROPOFOL 100 MCG/KG/MIN: 10 INJECTION, EMULSION INTRAVENOUS at 12:54

## 2025-02-25 RX ADMIN — LIDOCAINE HYDROCHLORIDE 40 MG: 20 INJECTION, SOLUTION INFILTRATION; PERINEURAL at 12:51

## 2025-02-25 RX ADMIN — ACETAMINOPHEN 975 MG: 325 TABLET, FILM COATED ORAL at 10:20

## 2025-02-25 RX ADMIN — PROPOFOL 200 MG: 10 INJECTION, EMULSION INTRAVENOUS at 12:51

## 2025-02-25 RX ADMIN — CELECOXIB 200 MG: 100 CAPSULE ORAL at 10:20

## 2025-02-25 SDOH — HEALTH STABILITY: MENTAL HEALTH: CURRENT SMOKER: 0

## 2025-02-25 ASSESSMENT — COLUMBIA-SUICIDE SEVERITY RATING SCALE - C-SSRS
1. IN THE PAST MONTH, HAVE YOU WISHED YOU WERE DEAD OR WISHED YOU COULD GO TO SLEEP AND NOT WAKE UP?: NO
6. HAVE YOU EVER DONE ANYTHING, STARTED TO DO ANYTHING, OR PREPARED TO DO ANYTHING TO END YOUR LIFE?: NO
2. HAVE YOU ACTUALLY HAD ANY THOUGHTS OF KILLING YOURSELF?: NO

## 2025-02-25 ASSESSMENT — PAIN SCALES - GENERAL
PAINLEVEL_OUTOF10: 0 - NO PAIN
PAINLEVEL_OUTOF10: 2
PAINLEVEL_OUTOF10: 0 - NO PAIN

## 2025-02-25 ASSESSMENT — PAIN - FUNCTIONAL ASSESSMENT
PAIN_FUNCTIONAL_ASSESSMENT: 0-10

## 2025-02-25 NOTE — ANESTHESIA POSTPROCEDURE EVALUATION
"Patient: Camilla Lal \"Ramona\"    Procedure Summary       Date: 02/25/25 Room / Location: [facility] OR 01 / [facility]    Anesthesia Start: 1247 Anesthesia Stop: 1406    Procedure: BIOPSY, BREAST, WITH MAGNETIC SEED LOCALIZATION (Right: Breast) Diagnosis:       Intraductal papilloma of right breast      Abnormal mammogram of right breast      (Intraductal papilloma of right breast [D24.1])      (Abnormal mammogram of right breast [R92.8])    Surgeons: Theodore Case MD Responsible Provider: GRACE Lorenzo    Anesthesia Type: general ASA Status: 2            Anesthesia Type: general    Vitals Value Taken Time   /56 02/25/25 1403   Temp 36.6 °C (97.9 °F) 02/25/25 1403   Pulse 58 02/25/25 1403   Resp 18 02/25/25 1403   SpO2 94 % 02/25/25 1403       Anesthesia Post Evaluation    Patient location during evaluation: PACU  Patient participation: complete - patient participated  Level of consciousness: awake and alert  Pain management: satisfactory to patient  Multimodal analgesia pain management approach  Airway patency: patent  Cardiovascular status: acceptable  Respiratory status: acceptable  Hydration status: acceptable  Postoperative Nausea and Vomiting: none        There were no known notable events for this encounter.    "

## 2025-02-25 NOTE — ANESTHESIA PROCEDURE NOTES
Airway  Date/Time: 2/25/2025 12:53 PM  Urgency: elective      Staffing  Performed: CRNA   Authorized by: GRACE Lorenzo    Performed by: GRACE Lorenzo  Patient location during procedure: OR    Indications and Patient Condition  Indications for airway management: anesthesia  Spontaneous ventilation: present  Sedation level: deep  Preoxygenated: yes  Mask difficulty assessment: 1 - vent by mask  Planned trial extubation    Final Airway Details  Final airway type: supraglottic airway      Successful airway: Size 4 (IGEL)     Number of attempts at approach: 1

## 2025-02-25 NOTE — OP NOTE
"BIOPSY, BREAST, WITH MAGNETIC SEED LOCALIZATION (R) Operative Note     Date: 2025  OR Location: [facility]    Name: Camilla Lal \"Ramona\", : 1955, Age: 69 y.o., MRN: [id number], Sex: female    Diagnosis  Pre-op Diagnosis      * Intraductal papilloma of right breast [D24.1]     * Abnormal mammogram of right breast [R92.8] Post-op Diagnosis     * Intraductal papilloma of right breast [D24.1]     * Abnormal mammogram of right breast [R92.8]     Procedures  BIOPSY, BREAST, WITH MAGNETIC SEED LOCALIZATION  69607 - OR EXC BREAST LES PREOP PLMT RAD MARKER OPEN 1 LES      Surgeons      * Theodore Case - Primary    Resident/Fellow/Other Assistant:  Surgeons and Role:     * Sebastián Wharton PA-C - Assisting    Staff:   Circulator: Miriam  Circulator: Samuel Renteria Person: Kesha Renteria Person: Mirna    Anesthesia Staff: CRNA: EMILY Lorenzo-CRNA    Procedure Summary  Anesthesia: General  ASA: II  Estimated Blood Loss: 2 mL  Intra-op Medications:   Administrations occurring from 1100 to 1240 on 25:   Medication Name Total Dose   aprepitant (Emend) capsule 40 mg 40 mg   povidone-iodine 5 % kit kit 1 Application              Anesthesia Record               Intraprocedure I/O Totals          Intake    Dexmedetomidine 0.00 mL    The total shown is the total volume documented since Anesthesia Start was filed.    Propofol Drip 0.00 mL    The total shown is the total volume documented since Anesthesia Start was filed.    Total Intake 0 mL          Specimen:   ID Type Source Tests Collected by Time   1 : Sututre marks long lateral, short superior Tissue BREAST, EXCISION OF MASS RIGHT SURGICAL PATHOLOGY EXAM Theodore Case MD 2025 1326                 Drains and/or Catheters: * None in log *    Tourniquet Times:         Implants:     Findings: The right breast specimen was identified with ultrasound and the Sentimag device.  Removal of the breast specimen was performed with Magseed and clip visualized on " "specimen mammography.  Ex vivo the clip was present in the specimen.  Vector margin marker was used to orient the specimen.    Indications: Camilla Lal \"Ramona\" is an 69 y.o. female who is having surgery for Intraductal papilloma of right breast [D24.1]  Abnormal mammogram of right breast [R92.8].  The patient had had a abnormal mammogram with a small lesion in the right breast.  This was followed but increase in size on surveillance ultrasonography and for this reason and excision was recommended.  The patient's original biopsy was intraductal papilloma with usual ductal hyperplasia.    The patient was seen in the preoperative area. The risks, benefits, complications, treatment options, non-operative alternatives, expected recovery and outcomes were discussed with the patient. The possibilities of reaction to medication, pulmonary aspiration, injury to surrounding structures, bleeding, recurrent infection, the need for additional procedures, failure to diagnose a condition, and creating a complication requiring transfusion or operation were discussed with the patient. The patient concurred with the proposed plan, giving informed consent.  The site of surgery was properly noted/marked if necessary per policy. The patient has been actively warmed in preoperative area. Preoperative antibiotics have been ordered and given within 1 hours of incision. Venous thrombosis prophylaxis have been ordered including bilateral sequential compression devices    Procedure Details: After obtaining informed consent with the patient discussing all the risks which include but not limited to pain, infection, bleeding, risk of positive margins, need for re-excision, risk of cardiac, pulmonary, neurologic, locomotor, anesthetic events, and other unforeseen complications including death, the right breast was interrogated with the Direct Hitg device.  The location of the Magseed was identified.  This was approximate 3 cm at the 9 " o'clock position in the right breast.  Ultrasound confirmed the presence of the clip.  The right breast was prepped and draped in aseptic fashion.  Local anesthetic instilled in the right lateral periareolar area.  An incision was made dissection was carried onto the subtenons tissue.  The occasion of the Magseed was marked.  I then created flaps superiorly inferiorly laterally and medially extending approximate 3 cm beyond the lesion.  I then localized the lesion and excised circumferentially with surrounding normal breast tissue.  This was marked with a long suture laterally and a short suture superiorly.  Hemostasis achieved with the breast cavity.  The vector margin marker was used to orient the breast specimen which was also marked with the radiology clips and sent to mammography with the findings as above.  The cavity is irrigated copiously.  There was complete hemostasis.  The breast tissue was opposed with 3-0 Vicryl suture to close the dead space after placing marking clips within the cavity.The subcutaneous tissue was closed with 3-0 Vicryl suture.  The skin was closed with 4 Monocryl suture.  Dressings were placed.  All sponge and instrument counts were correct x 2.  The patient tolerated the procedure well and was discharged to the recovery room in stable condition.  Complications:  None; patient tolerated the procedure well.    Disposition: PACU - hemodynamically stable.  Condition: stable     This procedure was not performed to treat breast cancer through sentinel node biopsy    The surgical assistant assisted with positioning, preparation of the surgical site, tissue retraction, suctioning, due to the nature of the case and the difficulty of the case.  The surgical assistant performed a primary closure and dressing application.     Additional Details: Time equals 45 minutes.  Wound classification 1.  Body mass index 24    Attending Attestation: I performed the procedure.    Theodore Case  Phone  Number: [phone]

## 2025-02-25 NOTE — DISCHARGE INSTRUCTIONS
Leave bra on during the day for the next 2 weeks. You can take the bra off at night. Leave the steri-strips on until they fall off. You are allowed to shower after 24 hours with the steri-strips on. Take Tylenol and Ibuprofen for any post-op pain.

## 2025-02-27 LAB — STAPHYLOCOCCUS SPEC CULT: NORMAL

## 2025-03-04 LAB
LABORATORY COMMENT REPORT: NORMAL
PATH REPORT.COMMENTS IMP SPEC: NORMAL
PATH REPORT.FINAL DX SPEC: NORMAL
PATH REPORT.GROSS SPEC: NORMAL
PATH REPORT.RELEVANT HX SPEC: NORMAL
PATH REPORT.TOTAL CANCER: NORMAL

## 2025-03-05 ENCOUNTER — TELEPHONE (OUTPATIENT)
Dept: SURGERY | Facility: HOSPITAL | Age: 70
End: 2025-03-05
Payer: MEDICARE

## 2025-03-05 NOTE — TELEPHONE ENCOUNTER
I contacted the patient today regarding follow-up of her pathology for her right Magseed lumpectomy.  This confirmed a papilloma    Surgical Pathology Exam: [id number]  Order: [id number]   Collected 2/25/2025 13:26       Status: Final result       Visible to patient: Yes (seen)       Dx: Intraductal papilloma of right breast...    0 Result Notes       Component  Resulting Agency   FINAL DIAGNOSIS   A. BREAST MASS, RIGHT, LUMPECTOMY:      -- Intraductal papilloma, see note.     Note: The papilloma appears to be entirely excised.        Plan-I discussed the findings with the patient.  She can be followed conservatively with yearly mammogram.  She wishes to cancel her appointment and her incision by her account is healing very nicely.  She will follow with me as needed.

## 2025-03-26 ENCOUNTER — APPOINTMENT (OUTPATIENT)
Dept: SURGERY | Facility: CLINIC | Age: 70
End: 2025-03-26
Payer: MEDICARE

## 2025-12-15 ENCOUNTER — APPOINTMENT (OUTPATIENT)
Dept: PRIMARY CARE | Facility: CLINIC | Age: 70
End: 2025-12-15
Payer: MEDICARE

## (undated) DEVICE — GLOVE, SURGICAL, PROTEXIS PI BLUE W/NEUTHERA, 7.0, PF, LF

## (undated) DEVICE — KIT, MARGINMARKER, 6 INK COLORS, STANDARD

## (undated) DEVICE — DRAPE, PAD, INSTRUMENT, MAGNETIC, MEDIUM, 10 X 16 IN, DISPOSABLE

## (undated) DEVICE — SOLUTION, IRRIGATION, STERILE WATER, 1000 ML, POUR BOTTLE

## (undated) DEVICE — DRESSING, TELFA, 3X4

## (undated) DEVICE — DRAPE PACK, GENERAL, CUSTOM, GENEVA

## (undated) DEVICE — GLOVE, SURGICAL, PROTEXIS PI , 7.0, PF, LF

## (undated) DEVICE — COVER, TABLE, 44 X 75 IN, DISPOSABLE, LF, STERILE

## (undated) DEVICE — SLEEVE, SUCTION, E-SEP, 165MM

## (undated) DEVICE — RADIOGRAPHY DEVICE, SPECIMEN, TRANSPEC

## (undated) DEVICE — SUTURE, MONOCRYL, 4-0, 18 IN, PS2, UNDYED

## (undated) DEVICE — DRAPE, SHEET, FAN FOLDED, MEDIUM, 44 X 72 IN, DISPOSABLE, LF, STERILE

## (undated) DEVICE — 00000 VISIT COUNTER

## (undated) DEVICE — APPLICATOR, CHLORAPREP, W/ORANGE TINT, 26ML

## (undated) DEVICE — SOLUTION, IRRIGATION, SODIUM CHLORIDE 0.9%, 1000 ML, POUR BOTTLE

## (undated) DEVICE — HOLSTER, ELECTROSURGERY ACCESSORY, STERILE

## (undated) DEVICE — SUTURE, VICRYL, 3-0, 27 IN, SH

## (undated) DEVICE — CORRECT CLIPS

## (undated) DEVICE — CLIP, LIGATING, LIGACLIP EXTRA, MEDIUM, TITANIUM, WHITE

## (undated) DEVICE — SUPPORT, MAMMARY, THERAPEUTIC, SURGI-BRA, LARGE, LF

## (undated) DEVICE — ELECTRODE, ELECTROSURGICAL, BLADE, E-Z CLEAN, 4 IN

## (undated) DEVICE — PROCEDURE KIT, ULTRASOUND, W/STERILE GEL AND TRANSDUCER COVER W/BAND, LF.

## (undated) DEVICE — SUTURE, SILK, 4-0, 18 IN, FS2, BLACK

## (undated) DEVICE — SUTURE, POLYSORB* 3/0  30  VIOLET ON A GU-46 NEEDLE"

## (undated) DEVICE — PAD, GROUNDING, ELECTROSURGICAL, W/9 FT CABLE, POLYHESIVE II, ADULT, LF

## (undated) DEVICE — LABELING SYSTEM, CORRECT MEDICATION, OR, 4 FLAGS, 2SETS OF 24

## (undated) DEVICE — SUTURE, SILK, 2-0, TIES, 12-30 IN, BLACK